# Patient Record
Sex: MALE | Race: WHITE | Employment: FULL TIME | URBAN - METROPOLITAN AREA
[De-identification: names, ages, dates, MRNs, and addresses within clinical notes are randomized per-mention and may not be internally consistent; named-entity substitution may affect disease eponyms.]

---

## 2019-04-08 RX ORDER — SODIUM CHLORIDE 0.9 % (FLUSH) 0.9 %
5-40 SYRINGE (ML) INJECTION AS NEEDED
Status: CANCELLED | OUTPATIENT
Start: 2019-04-08

## 2019-04-08 RX ORDER — SODIUM CHLORIDE 0.9 % (FLUSH) 0.9 %
5-40 SYRINGE (ML) INJECTION EVERY 8 HOURS
Status: CANCELLED | OUTPATIENT
Start: 2019-04-08

## 2019-04-08 NOTE — H&P
HPI:   10/1/2018- Right Great toe pain . Alix Delgadillo no treatment    Location of pain - great toe   Type/severity pain -  aching burning    Aggravating factors -  shoes; standing/walking  Alleviating factors -  rest     PMSFH:  Included on the patient history form ; reviewed; updated    MEDS:   None  ALLERGIES:  NKDA  PMH:  none  SOCIAL: , single, nonsmoker    ROS:  Per POA form: positive and negative system reviews were discussed. I tried to relate any positive system review to the musculoskeletal complaint. For all others, recommend the PCP or any specialists    PE:  He is alert and oriented. Nutrition, appearance and mood all okay. RESPIRATIONS:  Unlabored with no obvious shortness of breath. CV:  Appears to have pedal pulses, color, capillary refill, subungual color. NEURO:  No abnormal reflexes or clonus. Sensation appears mostly intact to light touch and sharp/dull discrimination. SLR negative. No popliteal tenderness     SKIN:  Age nails; no swelling; no keloids; MTP thick      MS:  Standing = Right great toe swelling. Gait = no pain   Right great toe, ME, dorsal hallucal nerve pain; limited motion; 5/5 strength; no instability or crepitance     XR: Right side - Standing AP, lateral, oblique of the foot taken 10- w/ mild hallux rigidus   XR Impression:    As noted above    DIAGNOSIS:   Right hallux rigidus    Right great toe pain swelling ?? gout       The patient and I discussed the above diagnoses and explored surgical and conservative options. Discussed the Right foot MRI scan, ordering labs, Ibuprofen 800 mg BID      Discussed  custom insoles, accommodative shoes;  Rx carbon fiber   Injection done 10- the 1st MTP joint was injected with 1 cc. of Xylocaine and 1 cc. of steroid   Surgical options outlined      Surgery - Right    big toe cheilectomy, possible implant      big toe phalangeal osteotomy     op - anesthesia choice - 1 hour      sag. saw, drill twists, possible Cartiva implant           The patient understands the diagnosis of w/ conservative and surgical treatment. Surgery, the risks and complications, and the expected postoperative course are all outlined. Understands the fact that this is arthritis and, that through the non-fusion surgical procedure,  I simply will try to decrease the prominence, decreases the impingement and pain and improve toe motion. The patient also understands that I cannot alter the long-term outcome of arthritis and that arthritis is something that will progress in the future which may necessitate a fusion (outlined and offered). The patient is aware and accepts all of it as a condition of treatment. Understands WB in postop shoe and the fact that it may take a year for all the swelling to resolve.   The patient accepts and understands future fusion or implant

## 2019-04-09 ENCOUNTER — ANESTHESIA EVENT (OUTPATIENT)
Dept: SURGERY | Age: 24
End: 2019-04-09
Payer: COMMERCIAL

## 2019-04-10 ENCOUNTER — HOSPITAL ENCOUNTER (OUTPATIENT)
Age: 24
Setting detail: OUTPATIENT SURGERY
Discharge: HOME | End: 2019-04-10
Attending: ORTHOPAEDIC SURGERY | Admitting: ORTHOPAEDIC SURGERY
Payer: COMMERCIAL

## 2019-04-10 ENCOUNTER — ANESTHESIA (OUTPATIENT)
Dept: SURGERY | Age: 24
End: 2019-04-10
Payer: COMMERCIAL

## 2019-04-10 VITALS
OXYGEN SATURATION: 97 % | HEART RATE: 86 BPM | BODY MASS INDEX: 41.27 KG/M2 | DIASTOLIC BLOOD PRESSURE: 82 MMHG | SYSTOLIC BLOOD PRESSURE: 126 MMHG | RESPIRATION RATE: 14 BRPM | WEIGHT: 315 LBS | TEMPERATURE: 98.6 F

## 2019-04-10 PROCEDURE — 77030002916 HC SUT ETHLN J&J -A: Performed by: ORTHOPAEDIC SURGERY

## 2019-04-10 PROCEDURE — 77030000032 HC CUF TRNQT ZIMM -B: Performed by: ORTHOPAEDIC SURGERY

## 2019-04-10 PROCEDURE — 76210000063 HC OR PH I REC FIRST 0.5 HR: Performed by: ORTHOPAEDIC SURGERY

## 2019-04-10 PROCEDURE — 77030031139 HC SUT VCRL2 J&J -A: Performed by: ORTHOPAEDIC SURGERY

## 2019-04-10 PROCEDURE — 74011250636 HC RX REV CODE- 250/636: Performed by: ORTHOPAEDIC SURGERY

## 2019-04-10 PROCEDURE — 74011250636 HC RX REV CODE- 250/636: Performed by: ANESTHESIOLOGY

## 2019-04-10 PROCEDURE — 77030019940 HC BLNKT HYPOTHRM STRY -B: Performed by: NURSE ANESTHETIST, CERTIFIED REGISTERED

## 2019-04-10 PROCEDURE — 76010010054 HC POST OP PAIN BLOCK: Performed by: ORTHOPAEDIC SURGERY

## 2019-04-10 PROCEDURE — 77030006788 HC BLD SAW OSC STRY -B: Performed by: ORTHOPAEDIC SURGERY

## 2019-04-10 PROCEDURE — 74011250636 HC RX REV CODE- 250/636

## 2019-04-10 PROCEDURE — 76942 ECHO GUIDE FOR BIOPSY: CPT | Performed by: ORTHOPAEDIC SURGERY

## 2019-04-10 PROCEDURE — 77030010509 HC AIRWY LMA MSK TELE -A: Performed by: NURSE ANESTHETIST, CERTIFIED REGISTERED

## 2019-04-10 PROCEDURE — 77030003902 HC BIT DRL TWST ZIMM -B: Performed by: ORTHOPAEDIC SURGERY

## 2019-04-10 PROCEDURE — 77030003602 HC NDL NRV BLK BBMI -B: Performed by: NURSE ANESTHETIST, CERTIFIED REGISTERED

## 2019-04-10 PROCEDURE — 76210000020 HC REC RM PH II FIRST 0.5 HR: Performed by: ORTHOPAEDIC SURGERY

## 2019-04-10 PROCEDURE — 77030018836 HC SOL IRR NACL ICUM -A: Performed by: ORTHOPAEDIC SURGERY

## 2019-04-10 PROCEDURE — 76060000033 HC ANESTHESIA 1 TO 1.5 HR: Performed by: ORTHOPAEDIC SURGERY

## 2019-04-10 PROCEDURE — 76010000138 HC OR TIME 0.5 TO 1 HR: Performed by: ORTHOPAEDIC SURGERY

## 2019-04-10 RX ORDER — LIDOCAINE HYDROCHLORIDE 20 MG/ML
INJECTION, SOLUTION EPIDURAL; INFILTRATION; INTRACAUDAL; PERINEURAL AS NEEDED
Status: DISCONTINUED | OUTPATIENT
Start: 2019-04-10 | End: 2019-04-10 | Stop reason: HOSPADM

## 2019-04-10 RX ORDER — LIDOCAINE HYDROCHLORIDE 10 MG/ML
0.1 INJECTION INFILTRATION; PERINEURAL AS NEEDED
Status: DISCONTINUED | OUTPATIENT
Start: 2019-04-10 | End: 2019-04-10 | Stop reason: HOSPADM

## 2019-04-10 RX ORDER — MIDAZOLAM HYDROCHLORIDE 1 MG/ML
2 INJECTION, SOLUTION INTRAMUSCULAR; INTRAVENOUS ONCE
Status: COMPLETED | OUTPATIENT
Start: 2019-04-10 | End: 2019-04-10

## 2019-04-10 RX ORDER — DEXAMETHASONE SODIUM PHOSPHATE 4 MG/ML
INJECTION, SOLUTION INTRA-ARTICULAR; INTRALESIONAL; INTRAMUSCULAR; INTRAVENOUS; SOFT TISSUE AS NEEDED
Status: DISCONTINUED | OUTPATIENT
Start: 2019-04-10 | End: 2019-04-10 | Stop reason: HOSPADM

## 2019-04-10 RX ORDER — SODIUM CHLORIDE, SODIUM LACTATE, POTASSIUM CHLORIDE, CALCIUM CHLORIDE 600; 310; 30; 20 MG/100ML; MG/100ML; MG/100ML; MG/100ML
100 INJECTION, SOLUTION INTRAVENOUS CONTINUOUS
Status: DISCONTINUED | OUTPATIENT
Start: 2019-04-10 | End: 2019-04-10 | Stop reason: HOSPADM

## 2019-04-10 RX ORDER — NALOXONE HYDROCHLORIDE 0.4 MG/ML
0.04 INJECTION, SOLUTION INTRAMUSCULAR; INTRAVENOUS; SUBCUTANEOUS
Status: DISCONTINUED | OUTPATIENT
Start: 2019-04-10 | End: 2019-04-10 | Stop reason: HOSPADM

## 2019-04-10 RX ORDER — ONDANSETRON 2 MG/ML
INJECTION INTRAMUSCULAR; INTRAVENOUS AS NEEDED
Status: DISCONTINUED | OUTPATIENT
Start: 2019-04-10 | End: 2019-04-10 | Stop reason: HOSPADM

## 2019-04-10 RX ORDER — SODIUM CHLORIDE 0.9 % (FLUSH) 0.9 %
5-40 SYRINGE (ML) INJECTION EVERY 8 HOURS
Status: DISCONTINUED | OUTPATIENT
Start: 2019-04-10 | End: 2019-04-10 | Stop reason: HOSPADM

## 2019-04-10 RX ORDER — SODIUM CHLORIDE 0.9 % (FLUSH) 0.9 %
5-40 SYRINGE (ML) INJECTION AS NEEDED
Status: DISCONTINUED | OUTPATIENT
Start: 2019-04-10 | End: 2019-04-10 | Stop reason: HOSPADM

## 2019-04-10 RX ORDER — FENTANYL CITRATE 50 UG/ML
100 INJECTION, SOLUTION INTRAMUSCULAR; INTRAVENOUS ONCE
Status: COMPLETED | OUTPATIENT
Start: 2019-04-10 | End: 2019-04-10

## 2019-04-10 RX ORDER — MIDAZOLAM HYDROCHLORIDE 1 MG/ML
2 INJECTION, SOLUTION INTRAMUSCULAR; INTRAVENOUS
Status: DISCONTINUED | OUTPATIENT
Start: 2019-04-10 | End: 2019-04-10 | Stop reason: HOSPADM

## 2019-04-10 RX ORDER — PROPOFOL 10 MG/ML
INJECTION, EMULSION INTRAVENOUS AS NEEDED
Status: DISCONTINUED | OUTPATIENT
Start: 2019-04-10 | End: 2019-04-10 | Stop reason: HOSPADM

## 2019-04-10 RX ORDER — OXYCODONE HYDROCHLORIDE 5 MG/1
5 TABLET ORAL
Status: DISCONTINUED | OUTPATIENT
Start: 2019-04-10 | End: 2019-04-10 | Stop reason: HOSPADM

## 2019-04-10 RX ORDER — HYDROMORPHONE HYDROCHLORIDE 2 MG/ML
0.5 INJECTION, SOLUTION INTRAMUSCULAR; INTRAVENOUS; SUBCUTANEOUS
Status: DISCONTINUED | OUTPATIENT
Start: 2019-04-10 | End: 2019-04-10 | Stop reason: HOSPADM

## 2019-04-10 RX ADMIN — FENTANYL CITRATE 100 MCG: 50 INJECTION INTRAMUSCULAR; INTRAVENOUS at 07:56

## 2019-04-10 RX ADMIN — PROPOFOL 200 MG: 10 INJECTION, EMULSION INTRAVENOUS at 09:24

## 2019-04-10 RX ADMIN — PROPOFOL 100 MG: 10 INJECTION, EMULSION INTRAVENOUS at 09:25

## 2019-04-10 RX ADMIN — DEXAMETHASONE SODIUM PHOSPHATE 4 MG: 4 INJECTION, SOLUTION INTRA-ARTICULAR; INTRALESIONAL; INTRAMUSCULAR; INTRAVENOUS; SOFT TISSUE at 09:29

## 2019-04-10 RX ADMIN — Medication 3 G: at 09:31

## 2019-04-10 RX ADMIN — LIDOCAINE HYDROCHLORIDE 60 MG: 20 INJECTION, SOLUTION EPIDURAL; INFILTRATION; INTRACAUDAL; PERINEURAL at 09:24

## 2019-04-10 RX ADMIN — SODIUM CHLORIDE, SODIUM LACTATE, POTASSIUM CHLORIDE, AND CALCIUM CHLORIDE 100 ML/HR: 600; 310; 30; 20 INJECTION, SOLUTION INTRAVENOUS at 07:54

## 2019-04-10 RX ADMIN — ONDANSETRON 4 MG: 2 INJECTION INTRAMUSCULAR; INTRAVENOUS at 09:29

## 2019-04-10 RX ADMIN — MIDAZOLAM HYDROCHLORIDE 2 MG: 2 INJECTION, SOLUTION INTRAMUSCULAR; INTRAVENOUS at 07:56

## 2019-04-10 NOTE — ANESTHESIA PROCEDURE NOTES
Peripheral Block    Start time: 4/10/2019 8:02 AM  End time: 4/10/2019 8:03 AM  Performed by: Christy Garay MD  Authorized by: Christy Garay MD       Pre-procedure: Indications: at surgeon's request and post-op pain management    Preanesthetic Checklist: patient identified, risks and benefits discussed, site marked, timeout performed, anesthesia consent given and patient being monitored    Timeout Time: 08:02          Block Type:   Block Type:  Saphenous  Laterality:  Right  Monitoring:  Standard ASA monitoring, responsive to questions, continuous pulse ox, oxygen, heart rate and frequent vital sign checks  Injection Technique:  Single shot  Patient Position: supine  Prep: chlorhexidine    Location:  Medial anterior leg above ankle  Catheter size: 25 ga 1.5 inch needle.   Needle Localization:  Anatomical landmarks    Assessment:  Number of attempts:  1  Injection Assessment:  Incremental injection every 5 mL, negative aspiration for blood, no intravascular symptoms and no paresthesia  Patient tolerance:  Patient tolerated the procedure well with no immediate complications

## 2019-04-10 NOTE — ANESTHESIA PROCEDURE NOTES
Peripheral Block    Start time: 4/10/2019 7:56 AM  End time: 4/10/2019 8:02 AM  Performed by: Sherlene Boas, MD  Authorized by: Sherlene Boas, MD       Pre-procedure: Indications: at surgeon's request, post-op pain management and procedure for pain    Preanesthetic Checklist: patient identified, risks and benefits discussed, site marked, timeout performed, anesthesia consent given and patient being monitored    Timeout Time: 07:56          Block Type:   Block Type:  Popliteal  Laterality:  Right  Monitoring:  Standard ASA monitoring, continuous pulse ox, frequent vital sign checks, heart rate, oxygen and responsive to questions  Injection Technique:  Single shot  Procedures: ultrasound guided and nerve stimulator    Prep: chlorhexidine    Needle Type:  Stimuplex (4 Inch)  Needle Gauge:  20 G  Needle Localization:  Ultrasound guidance and nerve stimulator  Motor Response: minimal motor response >0.4 mA      Assessment:  Number of attempts:  1  Injection Assessment:  Incremental injection every 5 mL, local visualized surrounding nerve on ultrasound, negative aspiration for blood, no paresthesia, no intravascular symptoms and ultrasound image on chart  Patient tolerance:  Patient tolerated the procedure well with no immediate complications  3 cc 1% lidocaine injected at site of needle insertion. Needle inserted and placed in close proximity to the nerve under real time ultrasound guidance. Ultrasound was used to visualize the spread of local anesthetic in close proximity to the nerve being blocked. The nerve appeared anatomically normal and there were no abnormal findings.

## 2019-04-10 NOTE — ANESTHESIA POSTPROCEDURE EVALUATION
Procedure(s): RIGHT BIG TOE CHEILECTOMy 
RIGHT BIG TOE OSTEOTOMY/. 
 
general 
 
Anesthesia Post Evaluation Patient location during evaluation: PACU Patient participation: complete - patient participated Level of consciousness: awake Pain management: satisfactory to patient Airway patency: patent Anesthetic complications: no 
Cardiovascular status: hemodynamically stable Respiratory status: spontaneous ventilation Hydration status: euvolemic Post anesthesia nausea and vomiting:  none Vitals Value Taken Time /87 4/10/2019 10:41 AM  
Temp 37 °C (98.6 °F) 4/10/2019 10:22 AM  
Pulse 85 4/10/2019 10:42 AM  
Resp 16 4/10/2019 10:27 AM  
SpO2 95 % 4/10/2019 10:42 AM  
Vitals shown include unvalidated device data.

## 2019-04-10 NOTE — ANESTHESIA PREPROCEDURE EVALUATION
Relevant Problems No relevant active problems Anesthetic History No history of anesthetic complications Review of Systems / Medical History Pertinent labs reviewed Pulmonary Within defined limits Neuro/Psych Within defined limits Cardiovascular Within defined limits Exercise tolerance: >4 METS 
  
GI/Hepatic/Renal 
Within defined limits Endo/Other Morbid obesity Other Findings Physical Exam 
 
Airway Mallampati: II 
TM Distance: 4 - 6 cm Neck ROM: normal range of motion Mouth opening: Normal 
 
 Cardiovascular Regular rate and rhythm,  S1 and S2 normal,  no murmur, click, rub, or gallop Dental 
No notable dental hx Pulmonary Breath sounds clear to auscultation Abdominal 
GI exam deferred Other Findings Anesthetic Plan ASA: 2 Anesthesia type: general 
 
 
Post-op pain plan if not by surgeon: peripheral nerve block single Induction: Intravenous Anesthetic plan and risks discussed with: Patient, Mother and Father Pt requests GA, plan LMA.

## 2019-04-10 NOTE — H&P
Outpatient Surgery History and Physical: 
[facility] CHIEF COMPLAINT:   LE 
 
PE: There were no vitals taken for this visit. Heart:  No noted problems Lungs:  No noted problems Past Medical History: There are no active problems to display for this patient. Surgical History:  
Past Surgical History:  
Procedure Laterality Date  HX ADENOIDECTOMY    
 wisom teeth removed Social History: Patient  reports that he has never smoked. He has never used smokeless tobacco. He reports that he drinks alcohol. He reports that he has current or past drug history. Family History:  
Family History Problem Relation Age of Onset  No Known Problems Mother  No Known Problems Father Allergies: Reviewed per EMR No Known Allergies Medications: No current facility-administered medications on file prior to encounter. No current outpatient medications on file prior to encounter. The surgery is planned for the Right big toe History and physical have been reviewed. There have been no significant  changes since the completion of the updated history and physical. 
 
Necessity for the procedure/care is still present and the updated history and physical office notes outline the full long term history of the problem. Please see the updated office notes for the full musculoskeletal examination and surgical plan.  
 
Signed By: Ben Jacobs MD   
 April 10, 2019 7:21 AM

## 2019-04-10 NOTE — DISCHARGE INSTRUCTIONS
INSTRUCTIONS FOLLOWING FOOT SURGERY    ACTIVITY  Elevate foot. No Ice  Protected partial weight bearing on the heel only as tolerated in post op shoe after full sensation returns. Let the office know if dressing gets saturated with water . DIET  Day of Surgery: Clear liquids until no nausea or vomiting; then light, bland diet (Baked chicken, plain rice, grits, scrambled egg, toast). Nothing Greasy, fried or spicy today  Advance to regular diet on second day, unless your doctor orders otherwise. PAIN  Take pain medications as directed by your doctor. Call your doctor if pain is NOT relieved by medication. PAIN MED SIDE EFFECTS  Constipation: Lots of fluids, try prune juice, then OTC stool softeners if necessary  Nausea: Take medication with food. DRESSING CARE Keep dry and in place until follow up appointment    CALL YOUR DOCTOR IF YOU HAVE  Excessive bleeding that does not stop after holding mild pressure over the area. Temperature of 101 degrees or above. Redness, excessive swelling or bruising, and/or green or yellow, smelly discharge from incision. Loss of sensation - cold, white or blue toes. AFTER ANESTHESIA  For the first 24 hours and while taking narcotics for pain: DO NOT Drive, Drink Alcoholic beverages, or make important Decisions. Be aware of dizziness following anesthesia and while taking pain medication. Preventing Infection at Home  We care about preventing infection and avoiding the spread of germs - not only when you are in the hospital but also when you return home. When you return home from the hospital, its important to take the following steps to help prevent infection and avoid spreading germs that could infect you and others. Ask everyone in your home to follow these guidelines, too. Clean Your Hands  · Clean your hands whenever your hands are visibly dirty, before you eat, before or after touching your mouth, nose or eyes, and before preparing food.  Clean them after contact with body fluids, using the restroom, touching animals or changing diapers. · When washing hands, wet them with warm water and work up a lather. Rub hands for at least 15 seconds, then rinse them and pat them dry with a clean towel or paper towel. · When using hand sanitizers, it should take about 15 seconds to rub your hands dry. If not, you probably didnt apply enough . Cover Your Sneeze or Cough  Germs are released into the air whenever you sneeze or cough. To prevent the spread of infection:  · Turn away from other people before coughing or sneezing. · Cover your mouth or nose with a tissue when you cough or sneeze. Put the tissue in the trash. · If you dont have a tissue, cough or sneeze into your upper sleeve, not your hands. · Always clean your hands after coughing or sneezing. Care for Wounds  Your skin is your bodys first line of defense against germs, but an open wound leaves an easy way for germs to enter your body. To prevent infection:  · Clean your hands before and after changing wound dressings, and wear gloves to change dressings if recommended by your doctor. · Take special care with IV lines or other devices inserted into the body. If you must touch them, clean your hands first.  · Follow any specific instructions from your doctor to care for your wounds. Contact your doctor if you experience any signs of infection, such as fever or increased redness at the surgical or wound site. Keep a Clean Home  · Clean or wipe commonly touched hard surfaces like door handles, sinks, tabletops, phones and TV remotes. · Use products labeled disinfectant to kill harmful bacteria and viruses. · Use a clean cloth or paper towel to clean and dry surfaces. Wiping surfaces with a dirty dishcloth, sponge or towel will only spread germs. · Never share toothbrushes, hutchins, drinking glasses, utensils, razor blades, face cloths or bath towels to avoid spreading germs.   · Be sure that the linens that you sleep on are clean. · Keep pets away from wounds and wash your hands after touching pets, their toys or bedding. We care about you and your health. Remember, preventing infections is a team effort between you, your family, friends and health care providers. DISCHARGE SUMMARY from Nurse    PATIENT INSTRUCTIONS:    After general anesthesia or intravenous sedation, for 24 hours or while taking prescription Narcotics:  · Limit your activities  · Do not drive and operate hazardous machinery  · Do not make important personal or business decisions  · Do  not drink alcoholic beverages  · If you have not urinated within 8 hours after discharge, please contact your surgeon on call. *  Please give a list of your current medications to your Primary Care Provider. *  Please update this list whenever your medications are discontinued, doses are      changed, or new medications (including over-the-counter products) are added. *  Please carry medication information at all times in case of emergency situations. These are general instructions for a healthy lifestyle:    No smoking/ No tobacco products/ Avoid exposure to second hand smoke    Surgeon General's Warning:  Quitting smoking now greatly reduces serious risk to your health. Obesity, smoking, and sedentary lifestyle greatly increases your risk for illness    A healthy diet, regular physical exercise & weight monitoring are important for maintaining a healthy lifestyle    You may be retaining fluid if you have a history of heart failure or if you experience any of the following symptoms:  Weight gain of 3 pounds or more overnight or 5 pounds in a week, increased swelling in our hands or feet or shortness of breath while lying flat in bed. Please call your doctor as soon as you notice any of these symptoms; do not wait until your next office visit.     Recognize signs and symptoms of STROKE:    F-face looks uneven    A-arms unable to move or move unevenly    S-speech slurred or non-existent    T-time-call 911 as soon as signs and symptoms begin-DO NOT go       Back to bed or wait to see if you get better-TIME IS BRAIN.

## 2019-04-11 NOTE — OP NOTES
[facility]  OPERATIVE REPORT    Name:  Beverley Lynn  MR#:  [id number]  :  1995  ACCOUNT #:  [de-identified]  DATE OF SERVICE:  04/10/2019    PREOPERATIVE DIAGNOSES:  1. Right hallux rigidus. 2.  Right hallux valgus interphalangeus. POSTOPERATIVE DIAGNOSES:  1. Right hallux rigidus. 2.  Right hallux valgus interphalangeus. PROCEDURE PERFORMED:  1. Right great toe cheilectomy. 2.  Right big toe phalangeal closing wedge osteotomy. SURGEON:  Ja Reyes MD    ANESTHESIA:  Regional.    COMPLICATIONS:  None. SPECIMENS REMOVED:  None. ESTIMATED BLOOD LOSS:  Minimal.    FLUIDS:  Crystalloid. DRAINS:  None. TOURNIQUET TIME:  None. FINDINGS:  Intraoperatively, the patient had enough arthritis to warrant a dorsomedial and lateral cheilectomy but not enough to warrant a Cartiva implant. He seemed to have preserved the central and plantar portions of the joint. PROCEDURE IN DETAIL:  After successful induction of regional anesthesia, right leg was scrubbed, prepped and draped in the usual sterile fashion. Antibiotic issued. Time-out procedure and identification of surgical markings were done by me. Right leg was addressed with the dorsomedial approach. Findings of above were seen. I was able to do a cheilectomy with removal of all impinging bone. After removal of all the bone, the toe had unabated range of motion. No impingement was noted and no problems were noted in the plantar aspect of the joint. Wound thoroughly cleaned and then closed with Vicryl. Toe was again taken through a range of motion and found to have unabated full motion. Proximal phalangeal closing wedge osteotomy was then performed. The phalangeal osteotomy was then closed with 0 Vicryl through drill holes and the wound was then thoroughly cleaned and closed with Ethilon. The patient was placed in sterile dressings and seemed to tolerate the procedure well.         STEVIE FORTUNE Shanique Garsia MD      [id number]  D:  04/10/2019 10:10  T:  04/10/2019 14:18  JOB #:  [id number]

## 2020-07-01 ENCOUNTER — HOSPITAL ENCOUNTER (OUTPATIENT)
Dept: PHYSICAL THERAPY | Age: 25
Discharge: HOME | End: 2020-07-01
Payer: COMMERCIAL

## 2020-07-01 PROCEDURE — 97161 PT EVAL LOW COMPLEX 20 MIN: CPT

## 2020-07-01 PROCEDURE — 97110 THERAPEUTIC EXERCISES: CPT

## 2020-07-01 NOTE — THERAPY EVALUATION
Wallace Lugo  : 1995  Primary: [place]  Secondary:  [address] @ [place] [address].  [phone]   [phone]       OUTPATIENT PHYSICAL THERAPY:Initial Assessment 2020   ICD-10: Treatment Diagnosis: Pain in right knee (M25.561) and Stiffness of right knee, not elsewhere classified (M25.661)  Precautions/Allergies:   Patient has no known allergies. Ambulatory/Rehab Services H2 Model Falls Risk Assessment    Risk Factors:       (1)  Gender [Male] Ability to Rise from Chair:       (0)  Ability to rise in a single movement    Falls Prevention Plan:       No modifications necessary   Total: (5 or greater = High Risk): 1     [facility] [employer]. All Rights Reserved. [facility] Patent #[id number]. Federal Law prohibits the replication, distribution or use without written permission from [employer]      MEDICAL/REFERRING DIAGNOSIS:  Pain in right knee [M25.561]   DATE OF ONSET: 20  REFERRING PHYSICIAN: Rivera Castle MD MD Orders: evaluate and treat: partial thickness PCL tear  Return MD Appointment: 6 weeks    INITIAL ASSESSMENT:  Mr. Verlena Barthel presents with impaired ROM and pain of right knee . This limits sitting, standing, lifting, bending and ambulatory tolerance and restricts ability to participate in ADLs, functional mobility and recreational activities. . Patient would benefit from skilled physical therapy to address above impairments. PROBLEM LIST (Impacting functional limitations):  1. Decreased Strength  2. Decreased ADL/Functional Activities  3. Decreased Transfer Abilities  4. Decreased Ambulation Ability/Technique  5. Increased Pain  6. Decreased Activity Tolerance  7. Decreased Flexibility/Joint Mobility INTERVENTIONS PLANNED: (Treatment may consist of any combination of the following)  1. Cryotherapy  2. Electrical Stimulation  3. Gait Training  4. Heat  5.  Home Exercise Program (HEP)  6. Manual Therapy  7. Neuromuscular Re-education/Strengthening  8. Therapeutic Activites  9. Therapeutic Exercise/Strengthening   TREATMENT PLAN:  Effective Dates: 7/1/2020 TO 7/31/2020 (30 days). Frequency/Duration: 2 times a week for 30 Day(s)  GOALS: (Goals have been discussed and agreed upon with patient.)  Short-Term Functional Goals: Time Frame: 2 weeks   # Goal Status   1 Pt will confirm compliance with home program to facilitate improvement in function. NEW     Discharge goals: Time frame: 4 weeks   # Goal Status   1 Pt will be able to tolerate sitting without limitation with no significant increase in symptoms to participate in ADLs such as desk work. NEW   2 Pt will increase walking tolerance to no limitation to be more mobile in community. NEW   3 Pt will have full knee extension without symptoms to be able to ambulate and perform functional mobility. NEW         Rehabilitation Potential For Stated Goals: Good  Regarding [facility] therapy, I certify that the treatment plan above will be carried out by a therapist or under their direction. Thank you for this referral,  Kelly Ayala, PT, DPT     Referring Physician Signature: Rivera Castle MD _______________________________ Date _____________     HISTORY:   History of Injury/Illness (Reason for Referral):  Pt reports he started having pain on 6/23/20 but does not know of any MARKY. He ignored it initially but it continued to get worse so he went to MD. He had x-ray and MRI which revealed partially torn PCL. He reports it feels like a deep ache in center of posterior knee with no numbness/tingling/burning. He reports he has pain if he walks or stands too long as well as with prolonged immobility such as sitting or lying down. He reports his pain has improved since MD told him to rest his knee and he decreased his activity. He would like to get back to PLOF including landscaping job and riding dirt bike.   Past Medical History/Comorbidities:   Mr. Trey Huerta  has no past medical history on file. Mr. Trey Huerta  has a past surgical history that includes hx adenoidectomy. Social History/Living Environment:     Pt lives with family in two-story home with stairs in home and tub/shower combo. Prior Level of Function/Work/Activity:  Pt works full-time as a landscaping worker. Pt had unrestricted prior level of function. Current Medications:     No current outpatient medications on file. Date Last Reviewed:  7/1/2020    Number of Personal Factors/Comorbidities that affect the Plan of Care: 0: LOW COMPLEXITY   EXAMINATION:   Observation: Unremarkable  Edema: Knee edema measurements   Left circumference (cm) Right circumference (cm)   Mid-patella 45.5 46     ROM:    Left (°) Right (°)   Flexion 140 140   Extension 3 Lacking 2      Lower body ANDT: Slump (+)  SLR (+) R, sensitized with spinal flexion and DF   Strength: WNL B with mild discomfort on R with resisted knee flexion and hip ER. Pt able to perform step up and down without compensation. Palpation: mild TTP with deep palpation of R posterior knee    Body Structures Involved:  1. Nerves  2. Joints  3. Muscles Body Functions Affected:  1. Sensory/Pain  2. Neuromusculoskeletal  3. Movement Related Activities and Participation Affected:  1. General Tasks and Demands  2. Mobility  3. Self Care  4. Community, Social and Fannin Monson   Number of elements (examined above) that affect the Plan of Care: 1-2: LOW COMPLEXITY   CLINICAL PRESENTATION:   Presentation: Stable and uncomplicated: LOW COMPLEXITY     CLINICAL DECISION MAKING:      OUTCOME MEASURE:   Initial outcome measure performed on 7/1/2020. Tool Used: Lower Extremity Functional Scale (LEFS)  Score:  Initial: 63/80 Most Recent: X/80 (Date: -- )   Interpretation of Score: 20 questions each scored on a 5 point scale with 0 representing \"extreme difficulty or unable to perform\" and 4 representing \"no difficulty\".   The lower the score, the greater the functional disability. 80/80 represents no disability. Minimal detectable change is 9 points. MEDICAL NECESSITY:   · Patient will benefit from skilled physical therapy to address their previously listed impairments in order to improve their independence with functional activities painfree. REASON FOR SERVICES/OTHER COMMENTS:  · Patient requires present interventions due to patient's inability to perform functional and recreational activities painfree.    Use of outcome tool(s) and clinical judgement create a POC that gives a: Clear prediction of patient's progress: LOW COMPLEXITY        Total Duration: 37 min  PT Patient Time In/Time Out  Time In: 1408  Time Out: 69 Latanya Mercer, PT, DPT

## 2020-07-01 NOTE — PROGRESS NOTES
Mohini Abel  : 1995  Primary: Jaiden Beckman  Secondary:  [address] @ [address], [facility].  [phone]   [phone]      OUTPATIENT PHYSICAL THERAPY: Daily Treatment Note 2020  Visit Count:  1    ICD-10: Treatment Diagnosis: Pain in right knee (M25.561) and Stiffness of right knee, not elsewhere classified (M25.661)  TREATMENT PLAN:  Effective Dates: 2020 TO 2020 (30 days). Frequency/Duration: 2 times a week for 30 Day(s)  GOALS: (Goals have been discussed and agreed upon with patient.)  Short-Term Functional Goals: Time Frame: 2 weeks   # Goal Status   1 Pt will confirm compliance with home program to facilitate improvement in function. NEW     Discharge goals: Time frame: 4 weeks   # Goal Status   1 Pt will be able to tolerate sitting without limitation with no significant increase in symptoms to participate in ADLs such as desk work. NEW   2 Pt will increase walking tolerance to no limitation to be more mobile in community. NEW   3 Pt will have full knee extension without symptoms to be able to ambulate and perform functional mobility. NEW       Pre-treatment Symptoms/Complaints:  Pt reports no pain at rest currently because he has been moving around. Pain: Initial:   0/10 Post Session:  0/10   Medications Last Reviewed: 2020  Updated Objective Findings: Below measures from initial evaluation unless otherwise noted. 20  Observation: Unremarkable  Edema: Knee edema measurements   Left circumference (cm) Right circumference (cm)   Mid-patella 45.5 46     ROM:    Left (°) Right (°)   Flexion 140 140   Extension 3 Lacking 2      Lower body ANDT: Slump (+)  SLR (+) R, sensitized with spinal flexion and DF   Strength: WNL B with mild discomfort on R with resisted knee flexion and hip ER. Pt able to perform step up and down without compensation.   Palpation: mild TTP with deep palpation of R posterior knee  LEFS: 63/80    TREATMENT:   THERAPEUTIC ACTIVITY: (see chart for minutes): Therapeutic activities per grid below to improve mobility, strength, balance and coordination. Required minimal visual, verbal and tactile cues to improve independence with functional mobility and activities. THERAPEUTIC EXERCISE: (see chart for minutes):  Exercises per grid below to improve mobility, strength, balance and coordination. Required minimal visual, verbal and tactile cues to promote proper body alignment and promote proper body mechanics. Progressed resistance, range, repetitions and complexity of movement as indicated. NEUROMUSCULAR RE-EDUCATION: (see chart for minutes):  Exercise/activities per grid below to improve balance, coordination, kinesthetic sense, posture, pain, and proprioception. Required minimal visual, verbal and tactile cues to promote static and dynamic balance in standing. MANUAL THERAPY: (see chart for minutes): Joint mobilization, Soft tissue mobilization, skin mobilization, and muscle energy techniques were utilized and necessary because of the patient's restricted joint motion, restricted motion of soft tissue and pain . MODALITIES: (see chart for minutes):      *  Cold Pack Therapy in order to provide analgesia. Date: 7/1/20 (visit 1)       Modalities:                Therapeutic Exercise: 25 min        SLR: x10 B  Hip abduction: x10 B  Hip IR in S/L: x10 B   Calf raises: x5. SL calf raises: x3 knee straight and bent  Lunges with R knee forward: x2   Step-ups and overs: x3 B   Sciatic nerve mobilization: 2x10 R with cervical flexion + knee flexion/PF to cervical/trunk extension + knee extension/DF. Pt educated on home program implementation and given printout.        Neuromuscular re-education                Manual Therapy:                Therapeutic Activities:                  Home program: 7/1/20: sciatic nerve mobilization, SLR, hip abduction, bridge    MedBridge Portal  Treatment/Session Summary:    · Response to Treatment: Pt tolerated exercises well and reported that his knee felt better after doing sciatic nerve mobilization. · Communication/Consultation:  None today  · Equipment provided today:  None today  · Recommendations/Intent for next treatment session: Next visit will focus on improving pain-free knee AROM, normalizing neurodynamics of knee, and improving activity tolearnce.     Total Treatment Billable Duration:  37 minutes  PT Patient Time In/Time Out  Time In: 2960  Time Out: [address]  Gama Zepeda PT, DPT    Future Appointments   Date Time Provider Lizbeth Aldridge   7/2/2020 10:15 AM Nolia Emms [facility]   7/6/2020  3:30 PM Nolia Emms [facility]   7/8/2020  3:30 PM Nolia Emms [facility]   7/13/2020  3:30 PM Nolia Emms [facility]   7/15/2020  2:45 PM Nolia Emms [facility]   7/20/2020  3:30 PM Nolia Emms [facility]   7/22/2020  2:45 PM Nolia Emms [facility]   7/27/2020  3:30 PM Nolia Emms [facility]   7/29/2020  2:45 PM Nolia Emms [facility]

## 2020-07-02 ENCOUNTER — HOSPITAL ENCOUNTER (OUTPATIENT)
Dept: PHYSICAL THERAPY | Age: 25
Discharge: HOME | End: 2020-07-02
Payer: COMMERCIAL

## 2020-07-02 PROCEDURE — 97110 THERAPEUTIC EXERCISES: CPT

## 2020-07-02 NOTE — PROGRESS NOTES
Daryn Griffiths  : 1995  Primary: Jaron Beckman  Secondary:  [address] @ [place] [id number] [facility], [address] [place] [address]  [phone]   [phone]      OUTPATIENT PHYSICAL THERAPY: Daily Treatment Note 2020  Visit Count:  2    ICD-10: Treatment Diagnosis: Pain in right knee (M25.561) and Stiffness of right knee, not elsewhere classified (M25.661)  TREATMENT PLAN:  Effective Dates: 2020 TO 2020 (30 days). Frequency/Duration: 2 times a week for 30 Day(s)  GOALS: (Goals have been discussed and agreed upon with patient.)  Short-Term Functional Goals: Time Frame: 2 weeks   # Goal Status   1 Pt will confirm compliance with home program to facilitate improvement in function. NEW     Discharge goals: Time frame: 4 weeks   # Goal Status   1 Pt will be able to tolerate sitting without limitation with no significant increase in symptoms to participate in ADLs such as desk work. NEW   2 Pt will increase walking tolerance to no limitation to be more mobile in community. NEW   3 Pt will have full knee extension without symptoms to be able to ambulate and perform functional mobility. NEW       Pre-treatment Symptoms/Complaints:  Pt reports he feels his leg feels slightly better today. He reports no soreness from exercises yesterday. Pain: Initial:   0/10 Post Session:  0/10   Medications Last Reviewed: 2020  Updated Objective Findings: Below measures from initial evaluation unless otherwise noted. 20  Observation: Unremarkable  Edema: Knee edema measurements   Left circumference (cm) Right circumference (cm)   Mid-patella 45.5 46     ROM:    Left (°) Right (°)   Flexion 140 140   Extension 3 Lacking 2      Lower body ANDT: Slump (+)  SLR (+) R, sensitized with spinal flexion and DF   Strength: WNL B with mild discomfort on R with resisted knee flexion and hip ER. Pt able to perform step up and down without compensation.   Palpation: mild TTP with deep palpation of R posterior knee  LEFS: 63/80    TREATMENT:   THERAPEUTIC ACTIVITY: (see chart for minutes): Therapeutic activities per grid below to improve mobility, strength, balance and coordination. Required minimal visual, verbal and tactile cues to improve independence with functional mobility and activities. THERAPEUTIC EXERCISE: (see chart for minutes):  Exercises per grid below to improve mobility, strength, balance and coordination. Required minimal visual, verbal and tactile cues to promote proper body alignment and promote proper body mechanics. Progressed resistance, range, repetitions and complexity of movement as indicated. NEUROMUSCULAR RE-EDUCATION: (see chart for minutes):  Exercise/activities per grid below to improve balance, coordination, kinesthetic sense, posture, pain, and proprioception. Required minimal visual, verbal and tactile cues to promote static and dynamic balance in standing. MANUAL THERAPY: (see chart for minutes): Joint mobilization, Soft tissue mobilization, skin mobilization, and muscle energy techniques were utilized and necessary because of the patient's restricted joint motion, restricted motion of soft tissue and pain . MODALITIES: (see chart for minutes):      *  Cold Pack Therapy in order to provide analgesia. Date: 7/1/20 (visit 1) 7/2/20 (visit 2)       Modalities:                Therapeutic Exercise: 25 min 42 min       SLR: x10 B  Hip abduction: x10 B  Hip IR in S/L: x10 B   Calf raises: x5. SL calf raises: x3 knee straight and bent  Lunges with R knee forward: x2   Step-ups and overs: x3 B   Sciatic nerve mobilization: 2x10 R with cervical flexion + knee flexion/PF to cervical/trunk extension + knee extension/DF. Pt educated on home program implementation and given printout.  Bike: 5'   SLR: 2x10 B  Bridge: x10  SL bridge: x10 B  Hip abduction: 2x10 B  Hip IR in S/L: 2x10 B   Lateral band walk: x1 lap with 80# band, knees straight and bent each Sciatic nerve mobilization: x10 R with cervical flexion + PF to cervical/trunk extension + DF. x10 with knee flexion/extension. Neuromuscular re-education                Manual Therapy:                Therapeutic Activities:                  Home program: 7/1/20: sciatic nerve mobilization, SLR, hip abduction, bridge    MedBridge Portal  Treatment/Session Summary:    · Response to Treatment: Pt tolerated exercises well and reported no pain afterwards just muscle tiredness. · Communication/Consultation:  None today  · Equipment provided today:  None today  · Recommendations/Intent for next treatment session: Next visit will focus on improving pain-free knee AROM, normalizing neurodynamics of knee, and improving activity tolearnce.     Total Treatment Billable Duration:  42 minutes  PT Patient Time In/Time Out  Time In: 1018  Time Out: [address] Janis Tran PT, DPT    Future Appointments   Date Time Provider Lizbeth Aldridge   7/6/2020  3:30 PM [facility]   7/8/2020  3:30 PM Camila Ping [facility]   7/13/2020  3:30 PM Camila Ping [facility]   7/15/2020  2:45 PM Camila Ping [facility]   7/20/2020  3:30 PM Camila Ping [facility]   7/22/2020  2:45 PM Camila Ping [facility]   7/27/2020  3:30 PM Camila Ping [facility]   7/29/2020  2:45 PM Camila Ping [facility]

## 2020-07-06 ENCOUNTER — HOSPITAL ENCOUNTER (OUTPATIENT)
Dept: PHYSICAL THERAPY | Age: 25
Discharge: HOME | End: 2020-07-06
Payer: COMMERCIAL

## 2020-07-06 PROCEDURE — 97110 THERAPEUTIC EXERCISES: CPT

## 2020-07-06 NOTE — PROGRESS NOTES
Tod Herrera  : 1995  Primary: [facility] [employer]  Secondary:  [address] @ [address], [place].  [phone]   [phone]      OUTPATIENT PHYSICAL THERAPY: Daily Treatment Note 2020  Visit Count:  3    ICD-10: Treatment Diagnosis: Pain in right knee (M25.561) and Stiffness of right knee, not elsewhere classified (M25.661)  TREATMENT PLAN:  Effective Dates: 2020 TO 2020 (30 days). Frequency/Duration: 2 times a week for 30 Day(s)  GOALS: (Goals have been discussed and agreed upon with patient.)  Short-Term Functional Goals: Time Frame: 2 weeks   # Goal Status   1 Pt will confirm compliance with home program to facilitate improvement in function. NEW     Discharge goals: Time frame: 4 weeks   # Goal Status   1 Pt will be able to tolerate sitting without limitation with no significant increase in symptoms to participate in ADLs such as desk work. NEW   2 Pt will increase walking tolerance to no limitation to be more mobile in community. NEW   3 Pt will have full knee extension without symptoms to be able to ambulate and perform functional mobility. NEW       Pre-treatment Symptoms/Complaints:  Pt reports his leg has been feeling significantly better. He reports previously his leg would sometimes hurt so bad he couldn't sleep. Now he reports it hurts at night a bit, but much less and he can sleep. He also reports driving feels better. Pain: Initial:   0/10 Post Session:  0/10   Medications Last Reviewed: 2020  Updated Objective Findings: Below measures from initial evaluation unless otherwise noted.   20  Observation: Unremarkable  Edema: Knee edema measurements   Left circumference (cm) Right circumference (cm)   Mid-patella 45.5 46     ROM:    Left (°) Right (°)   Flexion 140 140   Extension 3 Lacking 2      Lower body ANDT: Slump (+)  SLR (+) R, sensitized with spinal flexion and DF   Strength: WNL B with mild discomfort on R with resisted knee flexion and hip ER. Pt able to perform step up and down without compensation. Palpation: mild TTP with deep palpation of R posterior knee  LEFS: 63/80    TREATMENT:   THERAPEUTIC ACTIVITY: (see chart for minutes): Therapeutic activities per grid below to improve mobility, strength, balance and coordination. Required minimal visual, verbal and tactile cues to improve independence with functional mobility and activities. THERAPEUTIC EXERCISE: (see chart for minutes):  Exercises per grid below to improve mobility, strength, balance and coordination. Required minimal visual, verbal and tactile cues to promote proper body alignment and promote proper body mechanics. Progressed resistance, range, repetitions and complexity of movement as indicated. NEUROMUSCULAR RE-EDUCATION: (see chart for minutes):  Exercise/activities per grid below to improve balance, coordination, kinesthetic sense, posture, pain, and proprioception. Required minimal visual, verbal and tactile cues to promote static and dynamic balance in standing. MANUAL THERAPY: (see chart for minutes): Joint mobilization, Soft tissue mobilization, skin mobilization, and muscle energy techniques were utilized and necessary because of the patient's restricted joint motion, restricted motion of soft tissue and pain . MODALITIES: (see chart for minutes):      *  Cold Pack Therapy in order to provide analgesia. Date: 7/1/20 (visit 1) 7/2/20 (visit 2)  7/6/20 (visit 3)      Modalities:                Therapeutic Exercise: 25 min 42 min 43 min      SLR: x10 B  Hip abduction: x10 B  Hip IR in S/L: x10 B   Calf raises: x5. SL calf raises: x3 knee straight and bent  Lunges with R knee forward: x2   Step-ups and overs: x3 B   Sciatic nerve mobilization: 2x10 R with cervical flexion + knee flexion/PF to cervical/trunk extension + knee extension/DF. Pt educated on home program implementation and given printout.  Bike: 5'   SLR: 2x10 B  Bridge: x10  SL bridge: x10 B  Hip abduction: 2x10 B  Hip IR in S/L: 2x10 B   Lateral band walk: x1 lap with 80# band, knees straight and bent each   Sciatic nerve mobilization: x10 R with cervical flexion + PF to cervical/trunk extension + DF. x10 with knee flexion/extension. Bike: 5'   SLR: 2x10 B  SL bridge: 2x10 B   Sciatic nerve mobilization with knee flexion/extension: 2x10 R. Pt able to get knee almost completely extended now. Lunges on sliders: 2x8 B   Leg press: 3x5 with progressively increasing weight to warm-up. 5x240. Neuromuscular re-education                Manual Therapy:                Therapeutic Activities:                  Home program: 7/1/20: sciatic nerve mobilization, SLR, hip abduction, bridge    MedBridge Portal  Treatment/Session Summary:    · Response to Treatment: Pt progressing well and reports significant improvement in symptoms. He demonstrates improving neurodynamics and activity tolerance. · Communication/Consultation:  None today  · Equipment provided today:  None today  · Recommendations/Intent for next treatment session: Next visit will focus on improving pain-free knee AROM, normalizing neurodynamics of knee, and improving activity tolearnce.     Total Treatment Billable Duration:  43 minutes  PT Patient Time In/Time Out  Time In: 9110  Time Out: [address] Oliver Hatch, PT, DPT    Future Appointments   Date Time Provider Lizbeth Aldridge   7/8/2020  3:30 PM Margrette Randal [facility]   7/13/2020  3:30 PM Margrette Randal [facility]   7/15/2020  2:45 PM Margrette Randal [facility]   7/20/2020  3:30 PM Margrette Randal [facility]   7/22/2020  2:45 PM Margrette Randal [facility]   7/27/2020  3:30 PM Margrette Randal [facility]   7/29/2020  2:45 PM Margrette Randal [facility]

## 2020-07-08 ENCOUNTER — HOSPITAL ENCOUNTER (OUTPATIENT)
Dept: PHYSICAL THERAPY | Age: 25
Discharge: HOME | End: 2020-07-08
Payer: COMMERCIAL

## 2020-07-08 PROCEDURE — 97110 THERAPEUTIC EXERCISES: CPT

## 2020-07-08 NOTE — PROGRESS NOTES
Lucas Fleischer  : 1995  Primary: Chante Beckman  Secondary:  Cyril Abbasi @ [address], [address] [place] [address]  [phone]   [phone]      OUTPATIENT PHYSICAL THERAPY: Daily Treatment Note 2020  Visit Count:  4    ICD-10: Treatment Diagnosis: Pain in right knee (M25.561) and Stiffness of right knee, not elsewhere classified (M25.661)  TREATMENT PLAN:  Effective Dates: 2020 TO 2020 (30 days). Frequency/Duration: 2 times a week for 30 Day(s)  GOALS: (Goals have been discussed and agreed upon with patient.)  Short-Term Functional Goals: Time Frame: 2 weeks   # Goal Status   1 Pt will confirm compliance with home program to facilitate improvement in function. NEW     Discharge goals: Time frame: 4 weeks   # Goal Status   1 Pt will be able to tolerate sitting without limitation with no significant increase in symptoms to participate in ADLs such as desk work. NEW   2 Pt will increase walking tolerance to no limitation to be more mobile in community. NEW   3 Pt will have full knee extension without symptoms to be able to ambulate and perform functional mobility. NEW       Pre-treatment Symptoms/Complaints:  Pt reports sitting for prolonged periods of time, driving, and sleeping have continued to improve. He reports he still has tightness in posterior R knee with certain activities. Pain: Initial:   0/10 Post Session:  0/10   Medications Last Reviewed: 2020  Updated Objective Findings: Below measures from initial evaluation unless otherwise noted. 20  Observation: Unremarkable  Edema: Knee edema measurements   Left circumference (cm) Right circumference (cm)   Mid-patella 45.5 46     ROM:    Left (°) Right (°)   Flexion 140 140   Extension 3 Lacking 2      Lower body ANDT: Slump (+)  SLR (+) R, sensitized with spinal flexion and DF   Strength: WNL B with mild discomfort on R with resisted knee flexion and hip ER.  Pt able to perform step up and down without compensation. Palpation: mild TTP with deep palpation of R posterior knee  LEFS: 63/80    TREATMENT:   THERAPEUTIC ACTIVITY: (see chart for minutes): Therapeutic activities per grid below to improve mobility, strength, balance and coordination. Required minimal visual, verbal and tactile cues to improve independence with functional mobility and activities. THERAPEUTIC EXERCISE: (see chart for minutes):  Exercises per grid below to improve mobility, strength, balance and coordination. Required minimal visual, verbal and tactile cues to promote proper body alignment and promote proper body mechanics. Progressed resistance, range, repetitions and complexity of movement as indicated. NEUROMUSCULAR RE-EDUCATION: (see chart for minutes):  Exercise/activities per grid below to improve balance, coordination, kinesthetic sense, posture, pain, and proprioception. Required minimal visual, verbal and tactile cues to promote static and dynamic balance in standing. MANUAL THERAPY: (see chart for minutes): Joint mobilization, Soft tissue mobilization, skin mobilization, and muscle energy techniques were utilized and necessary because of the patient's restricted joint motion, restricted motion of soft tissue and pain . MODALITIES: (see chart for minutes):      *  Cold Pack Therapy in order to provide analgesia. Date: 7/1/20 (visit 1) 7/2/20 (visit 2)  7/6/20 (visit 3)  7/8/20 (visit 4)     Modalities:                Therapeutic Exercise: 25 min 42 min 43 min 40 min     SLR: x10 B  Hip abduction: x10 B  Hip IR in S/L: x10 B   Calf raises: x5. SL calf raises: x3 knee straight and bent  Lunges with R knee forward: x2   Step-ups and overs: x3 B   Sciatic nerve mobilization: 2x10 R with cervical flexion + knee flexion/PF to cervical/trunk extension + knee extension/DF. Pt educated on home program implementation and given printout.  Bike: 5'   SLR: 2x10 B  Bridge: x10  SL bridge: x10 B  Hip abduction: 2x10 B  Hip IR in S/L: 2x10 B   Lateral band walk: x1 lap with 80# band, knees straight and bent each   Sciatic nerve mobilization: x10 R with cervical flexion + PF to cervical/trunk extension + DF. x10 with knee flexion/extension. Bike: 5'   SLR: 2x10 B  SL bridge: 2x10 B   Sciatic nerve mobilization with knee flexion/extension: 2x10 R. Pt able to get knee almost completely extended now. Lunges on sliders: 2x8 B   Leg press: 3x5 with progressively increasing weight to warm-up. 5x240. Bike: 5'   SLR: 2x10 B  SL bridge: 2x10 B   Sciatic nerve mobilization with knee flexion/extension: x10 R  Sciatic nerve mobilization in long sitting with PF/DF and cervical flexion/extension: x10. Pt educated to add to home program.   50# Sled push: 2 laps with arms bent, 2 laps arms straight  SL RDL: x5 B. Pt reported a little feeling of strain on R. Neuromuscular re-education                Manual Therapy:                Therapeutic Activities:                  Home program: 7/1/20: sciatic nerve mobilization, SLR, hip abduction, bridge    MedBridge Portal  Treatment/Session Summary:    · Response to Treatment: Pt continues to tolerated exercises well and demonstrate improving neurodynamics. · Communication/Consultation:  None today  · Equipment provided today:  None today  · Recommendations/Intent for next treatment session: Next visit will focus on improving pain-free knee AROM, normalizing neurodynamics of knee, and improving activity tolearnce.     Total Treatment Billable Duration:  40 minutes  PT Patient Time In/Time Out  Time In: 2183  Time Out: [address] Dori Briceño, PT, DPT    Future Appointments   Date Time Provider Lizbeth Aldridge   7/13/2020  3:30 PM Talala Furrow [facility]   7/15/2020  2:45 PM Talala Furrow [facility]   7/20/2020  3:30 PM Talala Furrow [facility]   7/22/2020  2:45 PM Talala Furrow [facility]   7/27/2020  3:30 PM Talala Furrow [facility]   7/29/2020  2:45 PM Cresenciano Alvaroitar N [facility]

## 2020-07-10 ENCOUNTER — APPOINTMENT (OUTPATIENT)
Dept: PHYSICAL THERAPY | Age: 25
End: 2020-07-10
Payer: COMMERCIAL

## 2020-07-13 ENCOUNTER — HOSPITAL ENCOUNTER (OUTPATIENT)
Dept: PHYSICAL THERAPY | Age: 25
Discharge: HOME | End: 2020-07-13
Payer: COMMERCIAL

## 2020-07-13 NOTE — PROGRESS NOTES
Patient cancelled today's appointment due to conflict.  He has rescheduled for Friday.   -Samara Mercado, PT, DPT

## 2020-07-17 ENCOUNTER — APPOINTMENT (OUTPATIENT)
Dept: PHYSICAL THERAPY | Age: 25
End: 2020-07-17
Payer: COMMERCIAL

## 2020-07-17 ENCOUNTER — HOSPITAL ENCOUNTER (OUTPATIENT)
Dept: PHYSICAL THERAPY | Age: 25
Discharge: HOME | End: 2020-07-17
Payer: COMMERCIAL

## 2020-07-17 PROCEDURE — 97110 THERAPEUTIC EXERCISES: CPT

## 2020-07-17 NOTE — PROGRESS NOTES
Wallace Lugo  : 1995  Primary: [employer]  Secondary:  [address] @ [address], [address] [place] [address]  [phone]   [phone]      OUTPATIENT PHYSICAL THERAPY: Daily Treatment Note 2020  Visit Count:  5    ICD-10: Treatment Diagnosis: Pain in right knee (M25.561) and Stiffness of right knee, not elsewhere classified (M25.661)  TREATMENT PLAN:  Effective Dates: 2020 TO 2020 (30 days). Frequency/Duration: 2 times a week for 30 Day(s)  GOALS: (Goals have been discussed and agreed upon with patient.)  Short-Term Functional Goals: Time Frame: 2 weeks   # Goal Status   1 Pt will confirm compliance with home program to facilitate improvement in function. NEW     Discharge goals: Time frame: 4 weeks   # Goal Status   1 Pt will be able to tolerate sitting without limitation with no significant increase in symptoms to participate in ADLs such as desk work. NEW   2 Pt will increase walking tolerance to no limitation to be more mobile in community. NEW   3 Pt will have full knee extension without symptoms to be able to ambulate and perform functional mobility. NEW       Pre-treatment Symptoms/Complaints:  Pt reports his leg has continued to feel better. He reports he notices minor pain sometimes when lying down for prolonged periods but he can sleep. Pain: Initial:   0/10 Post Session:  0/10   Medications Last Reviewed: 2020  Updated Objective Findings: Below measures from initial evaluation unless otherwise noted. 20  Observation: Unremarkable  Edema: Knee edema measurements   Left circumference (cm) Right circumference (cm)   Mid-patella 45.5 46     ROM:    Left (°) Right (°)   Flexion 140 140   Extension 3 Lacking 2      Lower body ANDT: Slump (+)  SLR (+) R, sensitized with spinal flexion and DF   Strength: WNL B with mild discomfort on R with resisted knee flexion and hip ER.  Pt able to perform step up and down without compensation. Palpation: mild TTP with deep palpation of R posterior knee  LEFS: 63/80    TREATMENT:   THERAPEUTIC ACTIVITY: (see chart for minutes): Therapeutic activities per grid below to improve mobility, strength, balance and coordination. Required minimal visual, verbal and tactile cues to improve independence with functional mobility and activities. THERAPEUTIC EXERCISE: (see chart for minutes):  Exercises per grid below to improve mobility, strength, balance and coordination. Required minimal visual, verbal and tactile cues to promote proper body alignment and promote proper body mechanics. Progressed resistance, range, repetitions and complexity of movement as indicated. NEUROMUSCULAR RE-EDUCATION: (see chart for minutes):  Exercise/activities per grid below to improve balance, coordination, kinesthetic sense, posture, pain, and proprioception. Required minimal visual, verbal and tactile cues to promote static and dynamic balance in standing. MANUAL THERAPY: (see chart for minutes): Joint mobilization, Soft tissue mobilization, skin mobilization, and muscle energy techniques were utilized and necessary because of the patient's restricted joint motion, restricted motion of soft tissue and pain . MODALITIES: (see chart for minutes):      *  Cold Pack Therapy in order to provide analgesia. Date: 7/1/20 (visit 1) 7/2/20 (visit 2)  7/6/20 (visit 3)  7/8/20 (visit 4)  7/17/20 (visit 5)   Modalities:                Therapeutic Exercise: 25 min 42 min 43 min 40 min 40 min    SLR: x10 B  Hip abduction: x10 B  Hip IR in S/L: x10 B   Calf raises: x5. SL calf raises: x3 knee straight and bent  Lunges with R knee forward: x2   Step-ups and overs: x3 B   Sciatic nerve mobilization: 2x10 R with cervical flexion + knee flexion/PF to cervical/trunk extension + knee extension/DF. Pt educated on home program implementation and given printout.  Bike: 5'   SLR: 2x10 B  Bridge: x10  SL bridge: x10 B  Hip abduction: 2x10 B  Hip IR in S/L: 2x10 B   Lateral band walk: x1 lap with 80# band, knees straight and bent each   Sciatic nerve mobilization: x10 R with cervical flexion + PF to cervical/trunk extension + DF. x10 with knee flexion/extension. Bike: 5'   SLR: 2x10 B  SL bridge: 2x10 B   Sciatic nerve mobilization with knee flexion/extension: 2x10 R. Pt able to get knee almost completely extended now. Lunges on sliders: 2x8 B   Leg press: 3x5 with progressively increasing weight to warm-up. 5x240. Bike: 5'   SLR: 2x10 B  SL bridge: 2x10 B   Sciatic nerve mobilization with knee flexion/extension: x10 R  Sciatic nerve mobilization in long sitting with PF/DF and cervical flexion/extension: x10. Pt educated to add to home program.   50# Sled push: 2 laps with arms bent, 2 laps arms straight  SL RDL: x5 B. Pt reported a little feeling of strain on R. Bike: 5'   SLR: x10 B  SL bridge: x10 B   Sciatic nerve mobilization in long sitting with PF/DF: x10 with cervical flexion/extension. Advanced to no cervical motion to increase tension: x10. Lateral band walk with 80# band: x2 laps knees straight and bent each  SL RDL: x5 B  Lunges: x6 B   DL with 8# ball: x5  Squat with 8# ball: x8   Neuromuscular re-education                Manual Therapy:                Therapeutic Activities:                  Home program: 7/1/20: sciatic nerve mobilization, SLR, hip abduction, bridge    Med[employer] Portal  Treatment/Session Summary:    · Response to Treatment: Pt tolerated exercises well and continues to demonstrate progress. He was advanced to sciatic nerve mobilization with greater tension. · Communication/Consultation:  None today  · Equipment provided today:  None today  · Recommendations/Intent for next treatment session: Next visit will focus on improving pain-free knee AROM, normalizing neurodynamics of knee, and improving activity tolearnce.     Total Treatment Billable Duration:  40 minutes  PT Patient Time In/Time Out  Time In: 1620  Time Out: 24 [facility] Zacarias Archuleta, PT, DPT    Future Appointments   Date Time Provider Lizbeth Aldridge   7/20/2020  3:30 PM Gurpreet Bhandari [facility]   7/22/2020  2:45 PM Gurpreet Bhandari [facility]   7/27/2020  3:30 PM Gurpreet Bhandari [facility]   7/29/2020  2:45 PM Gurpreet Bhandari [facility]

## 2020-07-20 ENCOUNTER — HOSPITAL ENCOUNTER (OUTPATIENT)
Dept: PHYSICAL THERAPY | Age: 25
Discharge: HOME | End: 2020-07-20
Payer: COMMERCIAL

## 2020-07-20 PROCEDURE — 97110 THERAPEUTIC EXERCISES: CPT

## 2020-07-20 NOTE — PROGRESS NOTES
Yuki Sousa  : 1995  Primary: Jaja Beckman  Secondary:  [address] [place] @ [address], [address] [place] [address]  [phone]   [phone]      OUTPATIENT PHYSICAL THERAPY: Daily Treatment Note 2020  Visit Count:  6    ICD-10: Treatment Diagnosis: Pain in right knee (M25.561) and Stiffness of right knee, not elsewhere classified (M25.661)  TREATMENT PLAN:  Effective Dates: 2020 TO 2020 (30 days). Frequency/Duration: 2 times a week for 30 Day(s)  GOALS: (Goals have been discussed and agreed upon with patient.)  Short-Term Functional Goals: Time Frame: 2 weeks   # Goal Status   1 Pt will confirm compliance with home program to facilitate improvement in function. MET     Discharge goals: Time frame: 4 weeks   # Goal Status   1 Pt will be able to tolerate sitting without limitation with no significant increase in symptoms to participate in ADLs such as desk work. MET   2 Pt will increase walking tolerance to no limitation to be more mobile in community. NEW   3 Pt will have full knee extension without symptoms to be able to ambulate and perform functional mobility. NEW       Pre-treatment Symptoms/Complaints:  Pt reports his leg continues to feel good and improve. He reports that he has very little knee pain that only affects him sporadically. Pain: Initial:   0/10 Post Session:  0/10   Medications Last Reviewed: 2020  Updated Objective Findings: Below measures from initial evaluation unless otherwise noted. 20  Observation: Unremarkable  Edema: Knee edema measurements   Left circumference (cm) Right circumference (cm)   Mid-patella 45.5 46     ROM:    Left (°) Right (°)   Flexion 140 140   Extension 3 Lacking 2      Lower body ANDT: Slump (+)  SLR (+) R, sensitized with spinal flexion and DF   Strength: WNL B with mild discomfort on R with resisted knee flexion and hip ER.  Pt able to perform step up and down without compensation. Palpation: mild TTP with deep palpation of R posterior knee  LEFS: 63/80    TREATMENT:   THERAPEUTIC ACTIVITY: (see chart for minutes): Therapeutic activities per grid below to improve mobility, strength, balance and coordination. Required minimal visual, verbal and tactile cues to improve independence with functional mobility and activities. THERAPEUTIC EXERCISE: (see chart for minutes):  Exercises per grid below to improve mobility, strength, balance and coordination. Required minimal visual, verbal and tactile cues to promote proper body alignment and promote proper body mechanics. Progressed resistance, range, repetitions and complexity of movement as indicated. NEUROMUSCULAR RE-EDUCATION: (see chart for minutes):  Exercise/activities per grid below to improve balance, coordination, kinesthetic sense, posture, pain, and proprioception. Required minimal visual, verbal and tactile cues to promote static and dynamic balance in standing. MANUAL THERAPY: (see chart for minutes): Joint mobilization, Soft tissue mobilization, skin mobilization, and muscle energy techniques were utilized and necessary because of the patient's restricted joint motion, restricted motion of soft tissue and pain . MODALITIES: (see chart for minutes):      *  Cold Pack Therapy in order to provide analgesia. Date: 7/17/20 (visit 5) 7/20/20 (visit 6)        Modalities:                  Therapeutic Exercise: 40 min 40 min        Bike: 5'   SLR: x10 B  SL bridge: x10 B   Sciatic nerve mobilization in long sitting with PF/DF: x10 with cervical flexion/extension. Advanced to no cervical motion to increase tension: x10.   Lateral band walk with 80# band: x2 laps knees straight and bent each  SL RDL: x5 B  Lunges: x6 B   DL with 8# ball: x5  Squat with 8# ball: x8 Bike: 5'  SLR: x10 B   HS stretch with strap: x3 B  Sciatic nerve mobilization in long sitting with PF/DF: x15  Step-ups on 18\" box: x5 B  SL RDL: x5 B  Lateral band walk with 80# band: x2 laps knees straight and bent each  Squat with 8# ball: 2x8       Neuromuscular re-education                  Manual Therapy:                  Therapeutic Activities:                    Home program: 7/1/20: sciatic nerve mobilization, SLR, hip abduction, bridge    MedBridge Portal  Treatment/Session Summary:    · Response to Treatment: Pt continues to progress well and reports no pain with exercises. · Communication/Consultation:  None today  · Equipment provided today:  None today  · Recommendations/Intent for next treatment session: Next visit will focus on improving pain-free knee AROM, normalizing neurodynamics of knee, and improving activity tolearnce.     Total Treatment Billable Duration:  40 minutes  PT Patient Time In/Time Out  Time In: 6217  Time Out: 100 Nabil Mariee, PT, DPT    Future Appointments   Date Time Provider Lizbeth Aldridge   7/22/2020  2:45 PM Pascale Relic [facility]   7/27/2020  3:30 PM [facility]   7/29/2020  2:45 PM Pascale Relic [facility]

## 2020-07-22 ENCOUNTER — HOSPITAL ENCOUNTER (OUTPATIENT)
Dept: PHYSICAL THERAPY | Age: 25
Discharge: HOME | End: 2020-07-22
Payer: COMMERCIAL

## 2020-07-22 PROCEDURE — 97110 THERAPEUTIC EXERCISES: CPT

## 2020-07-22 NOTE — PROGRESS NOTES
Haile Pollard  : 1995  Primary: Nando Beckman  Secondary:  [address] @ [place] [address].  [phone]   [phone]      OUTPATIENT PHYSICAL THERAPY: Daily Treatment Note 2020  Visit Count:  7    ICD-10: Treatment Diagnosis: Pain in right knee (M25.561) and Stiffness of right knee, not elsewhere classified (M25.661)  TREATMENT PLAN:  Effective Dates: 2020 TO 2020 (30 days). Frequency/Duration: 2 times a week for 30 Day(s)  GOALS: (Goals have been discussed and agreed upon with patient.)  Short-Term Functional Goals: Time Frame: 2 weeks   # Goal Status   1 Pt will confirm compliance with home program to facilitate improvement in function. MET     Discharge goals: Time frame: 4 weeks   # Goal Status   1 Pt will be able to tolerate sitting without limitation with no significant increase in symptoms to participate in ADLs such as desk work. MET   2 Pt will increase walking tolerance to no limitation to be more mobile in community. NEW   3 Pt will have full knee extension without symptoms to be able to ambulate and perform functional mobility. NEW       Pre-treatment Symptoms/Complaints:  Pt reports that he thinks his leg pain is almost completely gone. He reports a few days he would get twinges but those have lessened now. Pain: Initial:   0/10 Post Session:  0/10   Medications Last Reviewed: 2020  Updated Objective Findings: Below measures from initial evaluation unless otherwise noted. 20  Observation: Unremarkable  Edema: Knee edema measurements   Left circumference (cm) Right circumference (cm)   Mid-patella 45.5 46     ROM:    Left (°) Right (°)   Flexion 140 140   Extension 3 Lacking 2      Lower body ANDT: Slump (+)  SLR (+) R, sensitized with spinal flexion and DF   Strength: WNL B with mild discomfort on R with resisted knee flexion and hip ER.  Pt able to perform step up and down without compensation. Palpation: mild TTP with deep palpation of R posterior knee  LEFS: 63/80    TREATMENT:   THERAPEUTIC ACTIVITY: (see chart for minutes): Therapeutic activities per grid below to improve mobility, strength, balance and coordination. Required minimal visual, verbal and tactile cues to improve independence with functional mobility and activities. THERAPEUTIC EXERCISE: (see chart for minutes):  Exercises per grid below to improve mobility, strength, balance and coordination. Required minimal visual, verbal and tactile cues to promote proper body alignment and promote proper body mechanics. Progressed resistance, range, repetitions and complexity of movement as indicated. NEUROMUSCULAR RE-EDUCATION: (see chart for minutes):  Exercise/activities per grid below to improve balance, coordination, kinesthetic sense, posture, pain, and proprioception. Required minimal visual, verbal and tactile cues to promote static and dynamic balance in standing. MANUAL THERAPY: (see chart for minutes): Joint mobilization, Soft tissue mobilization, skin mobilization, and muscle energy techniques were utilized and necessary because of the patient's restricted joint motion, restricted motion of soft tissue and pain . MODALITIES: (see chart for minutes):      *  Cold Pack Therapy in order to provide analgesia. Date: 7/17/20 (visit 5) 7/20/20 (visit 6)  7/22/20 (visit 7)       Modalities:                  Therapeutic Exercise: 40 min 40 min 40 min       Bike: 5'   SLR: x10 B  SL bridge: x10 B   Sciatic nerve mobilization in long sitting with PF/DF: x10 with cervical flexion/extension. Advanced to no cervical motion to increase tension: x10.   Lateral band walk with 80# band: x2 laps knees straight and bent each  SL RDL: x5 B  Lunges: x6 B   DL with 8# ball: x5  Squat with 8# ball: x8 Bike: 5'  SLR: x10 B   HS stretch with strap: x3 B  Sciatic nerve mobilization in long sitting with PF/DF: x15  Step-ups on 18\" box: x5 B  SL RDL: x5 B  Lateral band walk with 80# band: x2 laps knees straight and bent each  Squat with 8# ball: 2x8 Bike: 5'   SLR: 2x10 B   Sciatic nerve mobilization in long sitting with PF/DF: x10 with symmetrical ROM  Step-ups on 18\" box: 2x5 B  Sled push: 2x1 laps, arms straight and bent each  SL RDL: x5 B  Lateral band walk with 80# band: x laps knees straight and bent each      Neuromuscular re-education                  Manual Therapy:                  Therapeutic Activities:                    Home program: 7/1/20: sciatic nerve mobilization, SLR, hip abduction, bridge    Med[facility] Portal  Treatment/Session Summary:    · Response to Treatment: Pt tolerated all exercises well and continues to report improving function of knee. · Communication/Consultation:  None today  · Equipment provided today:  None today  · Recommendations/Intent for next treatment session: Next visit will focus on improving pain-free knee AROM, normalizing neurodynamics of knee, and improving activity tolearnce.     Total Treatment Billable Duration:  40 minutes  PT Patient Time In/Time Out  Time In: 1563  Time Out: 2463 [facility] Damaris Wilson, PT, DPT    Future Appointments   Date Time Provider Lizbeth Aldridge   7/27/2020  3:30 PM Dennys Marrero [facility]   7/29/2020  2:45 PM Dennys Marrero [facility]

## 2020-07-24 ENCOUNTER — APPOINTMENT (OUTPATIENT)
Dept: PHYSICAL THERAPY | Age: 25
End: 2020-07-24
Payer: COMMERCIAL

## 2020-07-27 ENCOUNTER — HOSPITAL ENCOUNTER (OUTPATIENT)
Dept: PHYSICAL THERAPY | Age: 25
Discharge: HOME | End: 2020-07-27
Payer: COMMERCIAL

## 2020-07-27 PROCEDURE — 97110 THERAPEUTIC EXERCISES: CPT

## 2020-07-27 NOTE — PROGRESS NOTES
Leslee Ramirez  : 1995  Primary: [employer]  Secondary:  [address] @ [place] [address].  [phone]   [phone]      OUTPATIENT PHYSICAL THERAPY: Daily Treatment Note 2020  Visit Count:  8    ICD-10: Treatment Diagnosis: Pain in right knee (M25.561) and Stiffness of right knee, not elsewhere classified (M25.661)  TREATMENT PLAN:  Effective Dates: 2020 TO 2020 (30 days). Frequency/Duration: 2 times a week for 30 Day(s)  GOALS: (Goals have been discussed and agreed upon with patient.)  Short-Term Functional Goals: Time Frame: 2 weeks   # Goal Status   1 Pt will confirm compliance with home program to facilitate improvement in function. MET     Discharge goals: Time frame: 4 weeks   # Goal Status   1 Pt will be able to tolerate sitting without limitation with no significant increase in symptoms to participate in ADLs such as desk work. MET   2 Pt will increase walking tolerance to no limitation to be more mobile in community. MET   3 Pt will have full knee extension without symptoms to be able to ambulate and perform functional mobility. MET       Pre-treatment Symptoms/Complaints:  Pt reports he feels no limitation with mobility now. He reports that he feels a twinge in knee every now and then while lying down but it then goes away in a few seconds. Pain: Initial:   0/10 Post Session:  0/10   Medications Last Reviewed: 2020  Updated Objective Findings: Below measures from initial evaluation unless otherwise noted. 20  Observation: Unremarkable  Edema: Knee edema measurements   Left circumference (cm) Right circumference (cm)   Mid-patella 45.5 46     ROM:    Left (°) Right (°)   Flexion 140 140   Extension 3 Lacking 2      Lower body ANDT: Slump (+)  SLR (+) R, sensitized with spinal flexion and DF   Strength: WNL B with mild discomfort on R with resisted knee flexion and hip ER.  Pt able to perform step up and down without compensation. Palpation: mild TTP with deep palpation of R posterior knee  LEFS: 63/80    TREATMENT:   THERAPEUTIC ACTIVITY: (see chart for minutes): Therapeutic activities per grid below to improve mobility, strength, balance and coordination. Required minimal visual, verbal and tactile cues to improve independence with functional mobility and activities. THERAPEUTIC EXERCISE: (see chart for minutes):  Exercises per grid below to improve mobility, strength, balance and coordination. Required minimal visual, verbal and tactile cues to promote proper body alignment and promote proper body mechanics. Progressed resistance, range, repetitions and complexity of movement as indicated. NEUROMUSCULAR RE-EDUCATION: (see chart for minutes):  Exercise/activities per grid below to improve balance, coordination, kinesthetic sense, posture, pain, and proprioception. Required minimal visual, verbal and tactile cues to promote static and dynamic balance in standing. MANUAL THERAPY: (see chart for minutes): Joint mobilization, Soft tissue mobilization, skin mobilization, and muscle energy techniques were utilized and necessary because of the patient's restricted joint motion, restricted motion of soft tissue and pain . MODALITIES: (see chart for minutes):      *  Cold Pack Therapy in order to provide analgesia. Date: 7/17/20 (visit 5) 7/20/20 (visit 6)  7/22/20 (visit 7)  7/27/20 (visit 8)      Modalities:                  Therapeutic Exercise: 40 min 40 min 40 min 40 min      Bike: 5'   SLR: x10 B  SL bridge: x10 B   Sciatic nerve mobilization in long sitting with PF/DF: x10 with cervical flexion/extension. Advanced to no cervical motion to increase tension: x10.   Lateral band walk with 80# band: x2 laps knees straight and bent each  SL RDL: x5 B  Lunges: x6 B   DL with 8# ball: x5  Squat with 8# ball: x8 Bike: 5'  SLR: x10 B   HS stretch with strap: x3 B  Sciatic nerve mobilization in long sitting with PF/DF: x15  Step-ups on 18\" box: x5 B  SL RDL: x5 B  Lateral band walk with 80# band: x2 laps knees straight and bent each  Squat with 8# ball: 2x8 Bike: 5'   SLR: 2x10 B   Sciatic nerve mobilization in long sitting with PF/DF: x10 with symmetrical ROM  Step-ups on 18\" box: 2x5 B  Sled push: 2x1 laps, arms straight and bent each  SL RDL: x5 B  Lateral band walk with 80# band: x laps knees straight and bent each Bike: 5'   SLR: 2x10 B   Sciatic nerve mobilization in long sitting with PF/DF: x10 with symmetrical ROM  Step-ups on 18\" box: x5 B  DL: 2x5x85#  SL RDL: x5 B  Lateral band walk with 80# band: x2 laps knees straight and bent each     Neuromuscular re-education                  Manual Therapy:                  Therapeutic Activities:                    Home program: 7/1/20: sciatic nerve mobilization, SLR, hip abduction, bridge    MedBridge Portal  Treatment/Session Summary:    · Response to Treatment: Pt tolerated exercises well and reported no pain. He reports no limitation with daily activities and mobility now. · Communication/Consultation:  None today  · Equipment provided today:  None today  · Recommendations/Intent for next treatment session: Next visit will focus on improving activity tolearnce.     Total Treatment Billable Duration:  40 minutes  PT Patient Time In/Time Out  Time In: 1949  Time Out: [address] Judy Rushing, PT, DPT    Future Appointments   Date Time Provider Lizbeth Aldridge   7/29/2020  2:45 PM Adrianna Coelho [facility]

## 2020-07-29 ENCOUNTER — HOSPITAL ENCOUNTER (OUTPATIENT)
Dept: PHYSICAL THERAPY | Age: 25
Discharge: HOME | End: 2020-07-29
Payer: COMMERCIAL

## 2020-07-29 PROCEDURE — 97110 THERAPEUTIC EXERCISES: CPT

## 2020-07-29 NOTE — PROGRESS NOTES
Wallace Lugo  : 1995  Primary: [employer]  Secondary:  [address] @ [address], [address] [place] [address]  [phone]   [phone]      OUTPATIENT PHYSICAL THERAPY: Daily Treatment and discharge Note 2020  Visit Count:  9    ICD-10: Treatment Diagnosis: Pain in right knee (M25.561) and Stiffness of right knee, not elsewhere classified (M25.661)  TREATMENT PLAN:  Effective Dates: 2020 TO 2020 (30 days). Frequency/Duration: 2 times a week for 30 Day(s)  GOALS: (Goals have been discussed and agreed upon with patient.)  Short-Term Functional Goals: Time Frame: 2 weeks   # Goal Status   1 Pt will confirm compliance with home program to facilitate improvement in function. MET     Discharge goals: Time frame: 4 weeks   # Goal Status   1 Pt will be able to tolerate sitting without limitation with no significant increase in symptoms to participate in ADLs such as desk work. MET   2 Pt will increase walking tolerance to no limitation to be more mobile in community. MET   3 Pt will have full knee extension without symptoms to be able to ambulate and perform functional mobility. MET       Pre-treatment Symptoms/Complaints:  Pt reports his knee feels like its back to normal. He reports even the twinges he would occasionally feel at rest seem to have gotten better. Pain: Initial:   0/10 Post Session:  0/10   Medications Last Reviewed: 2020  Updated Objective Findings: Below measures from initial evaluation unless otherwise noted. 20  Observation: Unremarkable  Edema: Knee edema measurements   Left circumference (cm) Right circumference (cm)   Mid-patella 45.5 46     ROM:    Left (°) Right (°)   Flexion 140 140   Extension 3 Lacking 2      Lower body ANDT: Slump (+)  SLR (+) R, sensitized with spinal flexion and DF   Strength: WNL B with mild discomfort on R with resisted knee flexion and hip ER.  Pt able to perform step up and down without compensation. Palpation: mild TTP with deep palpation of R posterior knee  LEFS: 63/80    7/29/20:   R knee flexion and extension WNL and pain-free. Slump in long-sitting (-) on R for tension and symmetrical to L side. Pt completes all exercises without pain or limitation. LEFS: 79/80  TREATMENT:   THERAPEUTIC ACTIVITY: (see chart for minutes): Therapeutic activities per grid below to improve mobility, strength, balance and coordination. Required minimal visual, verbal and tactile cues to improve independence with functional mobility and activities. THERAPEUTIC EXERCISE: (see chart for minutes):  Exercises per grid below to improve mobility, strength, balance and coordination. Required minimal visual, verbal and tactile cues to promote proper body alignment and promote proper body mechanics. Progressed resistance, range, repetitions and complexity of movement as indicated. NEUROMUSCULAR RE-EDUCATION: (see chart for minutes):  Exercise/activities per grid below to improve balance, coordination, kinesthetic sense, posture, pain, and proprioception. Required minimal visual, verbal and tactile cues to promote static and dynamic balance in standing. MANUAL THERAPY: (see chart for minutes): Joint mobilization, Soft tissue mobilization, skin mobilization, and muscle energy techniques were utilized and necessary because of the patient's restricted joint motion, restricted motion of soft tissue and pain . MODALITIES: (see chart for minutes):      *  Cold Pack Therapy in order to provide analgesia. Date: 7/17/20 (visit 5) 7/20/20 (visit 6)  7/22/20 (visit 7)  7/27/20 (visit 8)  7/29/20 (visit 9)     Modalities:                  Therapeutic Exercise: 40 min 40 min 40 min 40 min 40 min     Bike: 5'   SLR: x10 B  SL bridge: x10 B   Sciatic nerve mobilization in long sitting with PF/DF: x10 with cervical flexion/extension. Advanced to no cervical motion to increase tension: x10.   Lateral band walk with 80# band: x2 laps knees straight and bent each  SL RDL: x5 B  Lunges: x6 B   DL with 8# ball: x5  Squat with 8# ball: x8 Bike: 5'  SLR: x10 B   HS stretch with strap: x3 B  Sciatic nerve mobilization in long sitting with PF/DF: x15  Step-ups on 18\" box: x5 B  SL RDL: x5 B  Lateral band walk with 80# band: x2 laps knees straight and bent each  Squat with 8# ball: 2x8 Bike: 5'   SLR: 2x10 B   Sciatic nerve mobilization in long sitting with PF/DF: x10 with symmetrical ROM  Step-ups on 18\" box: 2x5 B  Sled push: 2x1 laps, arms straight and bent each  SL RDL: x5 B  Lateral band walk with 80# band: x laps knees straight and bent each Bike: 5'   SLR: 2x10 B   Sciatic nerve mobilization in long sitting with PF/DF: x10 with symmetrical ROM  Step-ups on 18\" box: x5 B  DL: 2x5x85#  SL RDL: x5 B  Lateral band walk with 80# band: x2 laps knees straight and bent each Bike: 5'   SLR: x10 B   Sciatic nerve mobilization in long sitting with PF/DF: x10 with symmetrical ROM  Step-ups on 18\" box: x5 B  DL: 5x85#  Sled push: 1 lap, arms straight and bent each  SL RDL: x5 B  Lateral band walk with 80# band: x2 laps knees straight and bent each    Neuromuscular re-education                  Manual Therapy:                  Therapeutic Activities:                    Home program: 7/1/20: sciatic nerve mobilization, SLR, hip abduction, bridge    [facility] Portal  Treatment/Session Summary:    · Response to Treatment: Pt tolerated exercises well. He has met all goals and reports no limitation or pain with all ADLs and exercises. He is being discharged to home program which he confirmed understanding of. · Communication/Consultation:  None today  · Equipment provided today:  None today  · Recommendations: D/C to home program    Total Treatment Billable Duration:  40 minutes  PT Patient Time In/Time Out  Time In: 1530  Time Out: [address], PT, DPT    No future appointments.

## 2020-07-31 ENCOUNTER — APPOINTMENT (OUTPATIENT)
Dept: PHYSICAL THERAPY | Age: 25
End: 2020-07-31
Payer: COMMERCIAL

## 2021-08-12 ENCOUNTER — HOSPITAL ENCOUNTER (OUTPATIENT)
Dept: OCCUPATIONAL MEDICINE | Age: 26
Discharge: HOME | End: 2021-08-12

## 2021-08-12 ENCOUNTER — TRANSCRIBE ORDER (OUTPATIENT)
Dept: OCCUPATIONAL MEDICINE | Age: 26
End: 2021-08-12

## 2021-08-12 DIAGNOSIS — T14.90XA INJURY: Primary | ICD-10-CM

## 2021-08-12 DIAGNOSIS — T14.90XA INJURY: ICD-10-CM

## 2021-09-21 RX ORDER — CEFAZOLIN SODIUM/WATER 2 G/20 ML
2 SYRINGE (ML) INTRAVENOUS ONCE
Status: CANCELLED | OUTPATIENT
Start: 2021-09-21 | End: 2021-09-21

## 2021-09-27 ENCOUNTER — HOSPITAL ENCOUNTER (OUTPATIENT)
Dept: SURGERY | Age: 26
Discharge: HOME | End: 2021-09-27
Attending: ORTHOPAEDIC SURGERY
Payer: COMMERCIAL

## 2021-09-27 VITALS
BODY MASS INDEX: 37.19 KG/M2 | WEIGHT: 315 LBS | OXYGEN SATURATION: 98 % | TEMPERATURE: 97.7 F | SYSTOLIC BLOOD PRESSURE: 149 MMHG | HEIGHT: 77 IN | RESPIRATION RATE: 16 BRPM | HEART RATE: 67 BPM | DIASTOLIC BLOOD PRESSURE: 79 MMHG

## 2021-09-27 LAB
BACTERIA SPEC CULT: ABNORMAL
SERVICE CMNT-IMP: ABNORMAL

## 2021-09-27 PROCEDURE — 87641 MR-STAPH DNA AMP PROBE: CPT

## 2021-09-27 RX ORDER — IBUPROFEN 200 MG
200 TABLET ORAL
COMMUNITY

## 2021-09-27 NOTE — PERIOP NOTES
Patient verified name, , and surgery as listed in [facility]. Patient provided medical/health information and PTA medications to the best of their ability. TYPE  CASE: 1B  Orders per surgeon: received  Labs per surgeon: MRSA/MSSA. Results: pending  Labs per anesthesia protocol: No additional.  EKG:  Not needed at time of PAT    Patient COVID test date 10/01/2021; The testing center is located at the . [address] , [place]. If appointment is needed patient provided telephone number of [phone]. Nasal Swab collected per MD order. Patient provided with and instructed on education handouts including Guide to Surgery, blood transfusions, pain management, and hand hygiene for the family and community, and [facility] brochure. Hibiclens and instructions given per hospital policy. Original medication prescription bottles not visualized during patient appointment. Patient teach back successful and patient demonstrates knowledge of instruction. PLEASE CONTINUE TAKING ALL PRESCRIPTION MEDICATIONS UP TO THE DAY OF SURGERY UNLESS OTHERWISE DIRECTED BELOW. DISCONTINUE all vitamins and supplements 7 days prior to surgery. DISCONTINUE Non-Steriodal Anti-Inflammatory (NSAIDS) such as Advil and Aleve 5 days prior to surgery. Home Medications to take  the day of surgery               Home Medications   to Hold     Ibuprofen         Comments    Covid test 10/1/2021 @ [address]   On the day before surgery please take Acetaminophen 1000mg in the morning and then again before bed. You may substitute for Tylenol 650 mg. Hibiclens shower the night before surgery and the morning of surgery. Please do not bring home medications with you on the day of surgery unless otherwise directed by your nurse.   If you are instructed to bring home medications, please give them to your nurse as they will be administered by the nursing staff. If you have any questions, please call [facility] Latanya Lima [phone] or [facility] [phone]. A copy of this note was provided to the patient for reference.

## 2021-09-29 NOTE — PERIOP NOTES
MSSA/MRSA SC BY PCR, NASAL SWAB [[id number]] (Order [id number])  Microbiology  Date: 9/27/2021 Department: [facility]a [facility] Or Pre Assessment Released By: Qing Oh (auto-released) Authorizing: Eriberto Batres MD   Contains abnormal data MSSA/MRSA SC BY PCR, NASAL SWAB  Order: [id number]  Collected:  9/27/2021 09:39 Status:  Final result  Specimen Information: Swab         0 Result Notes   Ref Range & Units 9/27/21 0939   Special Requests:   NO SPECIAL REQUESTS    Culture result:   Abnormal   MRSA target DNA not detected, SA target DNA detected.   A MRSA negative, SA positive test result does not preclude MRSA nasal colonization.    [address] [facility].         Specimen Collected: 09/27/21 09:39 Last Resulted: 09/27/21 20:18       Order Details     Lab and Collection Details     Routing     Result History              Specimen Information    Swab     Collected: 9/27/2021 [id number]

## 2021-10-04 ENCOUNTER — ANESTHESIA EVENT (OUTPATIENT)
Dept: SURGERY | Age: 26
End: 2021-10-04
Payer: COMMERCIAL

## 2021-10-05 ENCOUNTER — APPOINTMENT (OUTPATIENT)
Dept: GENERAL RADIOLOGY | Age: 26
End: 2021-10-05
Attending: ORTHOPAEDIC SURGERY
Payer: COMMERCIAL

## 2021-10-05 ENCOUNTER — HOSPITAL ENCOUNTER (OUTPATIENT)
Age: 26
Setting detail: OUTPATIENT SURGERY
Discharge: HOME | End: 2021-10-05
Attending: ORTHOPAEDIC SURGERY | Admitting: ORTHOPAEDIC SURGERY
Payer: COMMERCIAL

## 2021-10-05 ENCOUNTER — ANESTHESIA (OUTPATIENT)
Dept: SURGERY | Age: 26
End: 2021-10-05
Payer: COMMERCIAL

## 2021-10-05 VITALS
HEIGHT: 77 IN | DIASTOLIC BLOOD PRESSURE: 78 MMHG | WEIGHT: 315 LBS | TEMPERATURE: 98.5 F | HEART RATE: 79 BPM | SYSTOLIC BLOOD PRESSURE: 145 MMHG | RESPIRATION RATE: 19 BRPM | OXYGEN SATURATION: 97 % | BODY MASS INDEX: 37.19 KG/M2

## 2021-10-05 DIAGNOSIS — M54.16 LUMBAR RADICULOPATHY: Primary | ICD-10-CM

## 2021-10-05 PROBLEM — M51.26 LUMBAR DISC HERNIATION: Status: ACTIVE | Noted: 2021-10-05

## 2021-10-05 PROCEDURE — 74011250637 HC RX REV CODE- 250/637: Performed by: ORTHOPAEDIC SURGERY

## 2021-10-05 PROCEDURE — 77030031139 HC SUT VCRL2 J&J -A: Performed by: ORTHOPAEDIC SURGERY

## 2021-10-05 PROCEDURE — 77030034479 HC ADH SKN CLSR PRINEO J&J -B: Performed by: ORTHOPAEDIC SURGERY

## 2021-10-05 PROCEDURE — 74011250636 HC RX REV CODE- 250/636: Performed by: ORTHOPAEDIC SURGERY

## 2021-10-05 PROCEDURE — 74011000250 HC RX REV CODE- 250: Performed by: ORTHOPAEDIC SURGERY

## 2021-10-05 PROCEDURE — 76060000033 HC ANESTHESIA 1 TO 1.5 HR: Performed by: ORTHOPAEDIC SURGERY

## 2021-10-05 PROCEDURE — 63047 LAM FACETEC & FORAMOT LUMBAR: CPT | Performed by: ORTHOPAEDIC SURGERY

## 2021-10-05 PROCEDURE — 77030039425 HC BLD LARYNG TRULITE DISP TELE -A: Performed by: NURSE ANESTHETIST, CERTIFIED REGISTERED

## 2021-10-05 PROCEDURE — 76210000016 HC OR PH I REC 1 TO 1.5 HR: Performed by: ORTHOPAEDIC SURGERY

## 2021-10-05 PROCEDURE — 77030018673: Performed by: ORTHOPAEDIC SURGERY

## 2021-10-05 PROCEDURE — 74011250637 HC RX REV CODE- 250/637: Performed by: ANESTHESIOLOGY

## 2021-10-05 PROCEDURE — 76210000021 HC REC RM PH II 0.5 TO 1 HR: Performed by: ORTHOPAEDIC SURGERY

## 2021-10-05 PROCEDURE — 74011000272 HC RX REV CODE- 272: Performed by: ORTHOPAEDIC SURGERY

## 2021-10-05 PROCEDURE — 77030012894: Performed by: ORTHOPAEDIC SURGERY

## 2021-10-05 PROCEDURE — 74011250636 HC RX REV CODE- 250/636: Performed by: ANESTHESIOLOGY

## 2021-10-05 PROCEDURE — 2709999900 HC NON-CHARGEABLE SUPPLY: Performed by: ORTHOPAEDIC SURGERY

## 2021-10-05 PROCEDURE — 77030025623 HC BUR RND PRECIS STRY -D: Performed by: ORTHOPAEDIC SURGERY

## 2021-10-05 PROCEDURE — 77030040361 HC SLV COMPR DVT MDII -B: Performed by: ORTHOPAEDIC SURGERY

## 2021-10-05 PROCEDURE — 77030028270 HC SRGFL HEMSTAT MTRX J&J -C: Performed by: ORTHOPAEDIC SURGERY

## 2021-10-05 PROCEDURE — 77030037088 HC TUBE ENDOTRACH ORAL NSL COVD-A: Performed by: NURSE ANESTHETIST, CERTIFIED REGISTERED

## 2021-10-05 PROCEDURE — 77030029099 HC BN WAX SSPC -A: Performed by: ORTHOPAEDIC SURGERY

## 2021-10-05 PROCEDURE — 76010000161 HC OR TIME 1 TO 1.5 HR INTENSV-TIER 1: Performed by: ORTHOPAEDIC SURGERY

## 2021-10-05 PROCEDURE — 74011000250 HC RX REV CODE- 250: Performed by: NURSE ANESTHETIST, CERTIFIED REGISTERED

## 2021-10-05 PROCEDURE — 74011250636 HC RX REV CODE- 250/636: Performed by: NURSE ANESTHETIST, CERTIFIED REGISTERED

## 2021-10-05 PROCEDURE — 77030040922 HC BLNKT HYPOTHRM STRY -A: Performed by: ANESTHESIOLOGY

## 2021-10-05 RX ORDER — MIDAZOLAM HYDROCHLORIDE 1 MG/ML
2 INJECTION, SOLUTION INTRAMUSCULAR; INTRAVENOUS ONCE
Status: DISCONTINUED | OUTPATIENT
Start: 2021-10-05 | End: 2021-10-05 | Stop reason: HOSPADM

## 2021-10-05 RX ORDER — VANCOMYCIN HYDROCHLORIDE 1 G/20ML
INJECTION, POWDER, LYOPHILIZED, FOR SOLUTION INTRAVENOUS AS NEEDED
Status: DISCONTINUED | OUTPATIENT
Start: 2021-10-05 | End: 2021-10-05 | Stop reason: HOSPADM

## 2021-10-05 RX ORDER — SODIUM CHLORIDE, SODIUM LACTATE, POTASSIUM CHLORIDE, CALCIUM CHLORIDE 600; 310; 30; 20 MG/100ML; MG/100ML; MG/100ML; MG/100ML
75 INJECTION, SOLUTION INTRAVENOUS CONTINUOUS
Status: DISCONTINUED | OUTPATIENT
Start: 2021-10-05 | End: 2021-10-05 | Stop reason: HOSPADM

## 2021-10-05 RX ORDER — SUCCINYLCHOLINE CHLORIDE 20 MG/ML
INJECTION INTRAMUSCULAR; INTRAVENOUS AS NEEDED
Status: DISCONTINUED | OUTPATIENT
Start: 2021-10-05 | End: 2021-10-05 | Stop reason: HOSPADM

## 2021-10-05 RX ORDER — ACETAMINOPHEN 500 MG
1000 TABLET ORAL ONCE
Status: COMPLETED | OUTPATIENT
Start: 2021-10-05 | End: 2021-10-05

## 2021-10-05 RX ORDER — KETAMINE HYDROCHLORIDE 50 MG/ML
INJECTION, SOLUTION INTRAMUSCULAR; INTRAVENOUS AS NEEDED
Status: DISCONTINUED | OUTPATIENT
Start: 2021-10-05 | End: 2021-10-05 | Stop reason: HOSPADM

## 2021-10-05 RX ORDER — ONDANSETRON 2 MG/ML
INJECTION INTRAMUSCULAR; INTRAVENOUS AS NEEDED
Status: DISCONTINUED | OUTPATIENT
Start: 2021-10-05 | End: 2021-10-05 | Stop reason: HOSPADM

## 2021-10-05 RX ORDER — NEOSTIGMINE METHYLSULFATE 1 MG/ML
INJECTION, SOLUTION INTRAVENOUS AS NEEDED
Status: DISCONTINUED | OUTPATIENT
Start: 2021-10-05 | End: 2021-10-05 | Stop reason: HOSPADM

## 2021-10-05 RX ORDER — LIDOCAINE HYDROCHLORIDE 20 MG/ML
INJECTION, SOLUTION EPIDURAL; INFILTRATION; INTRACAUDAL; PERINEURAL AS NEEDED
Status: DISCONTINUED | OUTPATIENT
Start: 2021-10-05 | End: 2021-10-05 | Stop reason: HOSPADM

## 2021-10-05 RX ORDER — BUPIVACAINE HYDROCHLORIDE AND EPINEPHRINE 5; 5 MG/ML; UG/ML
INJECTION, SOLUTION EPIDURAL; INTRACAUDAL; PERINEURAL AS NEEDED
Status: DISCONTINUED | OUTPATIENT
Start: 2021-10-05 | End: 2021-10-05 | Stop reason: HOSPADM

## 2021-10-05 RX ORDER — ROCURONIUM BROMIDE 10 MG/ML
INJECTION, SOLUTION INTRAVENOUS AS NEEDED
Status: DISCONTINUED | OUTPATIENT
Start: 2021-10-05 | End: 2021-10-05 | Stop reason: HOSPADM

## 2021-10-05 RX ORDER — MIDAZOLAM HYDROCHLORIDE 1 MG/ML
2 INJECTION, SOLUTION INTRAMUSCULAR; INTRAVENOUS
Status: DISCONTINUED | OUTPATIENT
Start: 2021-10-05 | End: 2021-10-05 | Stop reason: HOSPADM

## 2021-10-05 RX ORDER — ACETAMINOPHEN 500 MG
1000 TABLET ORAL
COMMUNITY

## 2021-10-05 RX ORDER — SODIUM CHLORIDE, SODIUM LACTATE, POTASSIUM CHLORIDE, CALCIUM CHLORIDE 600; 310; 30; 20 MG/100ML; MG/100ML; MG/100ML; MG/100ML
25 INJECTION, SOLUTION INTRAVENOUS CONTINUOUS
Status: DISCONTINUED | OUTPATIENT
Start: 2021-10-05 | End: 2021-10-05 | Stop reason: HOSPADM

## 2021-10-05 RX ORDER — OXYCODONE HYDROCHLORIDE 5 MG/1
5 TABLET ORAL
Status: COMPLETED | OUTPATIENT
Start: 2021-10-05 | End: 2021-10-05

## 2021-10-05 RX ORDER — FENTANYL CITRATE 50 UG/ML
INJECTION, SOLUTION INTRAMUSCULAR; INTRAVENOUS AS NEEDED
Status: DISCONTINUED | OUTPATIENT
Start: 2021-10-05 | End: 2021-10-05 | Stop reason: HOSPADM

## 2021-10-05 RX ORDER — DEXAMETHASONE SODIUM PHOSPHATE 4 MG/ML
INJECTION, SOLUTION INTRA-ARTICULAR; INTRALESIONAL; INTRAMUSCULAR; INTRAVENOUS; SOFT TISSUE AS NEEDED
Status: DISCONTINUED | OUTPATIENT
Start: 2021-10-05 | End: 2021-10-05 | Stop reason: HOSPADM

## 2021-10-05 RX ORDER — SCOLOPAMINE TRANSDERMAL SYSTEM 1 MG/1
1 PATCH, EXTENDED RELEASE TRANSDERMAL ONCE
Status: DISCONTINUED | OUTPATIENT
Start: 2021-10-05 | End: 2021-10-05 | Stop reason: HOSPADM

## 2021-10-05 RX ORDER — GLYCOPYRROLATE 0.2 MG/ML
INJECTION INTRAMUSCULAR; INTRAVENOUS AS NEEDED
Status: DISCONTINUED | OUTPATIENT
Start: 2021-10-05 | End: 2021-10-05 | Stop reason: HOSPADM

## 2021-10-05 RX ORDER — HALOPERIDOL 5 MG/ML
1 INJECTION INTRAMUSCULAR
Status: COMPLETED | OUTPATIENT
Start: 2021-10-05 | End: 2021-10-05

## 2021-10-05 RX ORDER — HYDROMORPHONE HYDROCHLORIDE 2 MG/ML
0.5 INJECTION, SOLUTION INTRAMUSCULAR; INTRAVENOUS; SUBCUTANEOUS
Status: DISCONTINUED | OUTPATIENT
Start: 2021-10-05 | End: 2021-10-05 | Stop reason: HOSPADM

## 2021-10-05 RX ORDER — LIDOCAINE HYDROCHLORIDE 10 MG/ML
0.1 INJECTION INFILTRATION; PERINEURAL AS NEEDED
Status: DISCONTINUED | OUTPATIENT
Start: 2021-10-05 | End: 2021-10-05 | Stop reason: HOSPADM

## 2021-10-05 RX ORDER — OXYCODONE HYDROCHLORIDE 5 MG/1
5 TABLET ORAL
Qty: 30 TABLET | Refills: 0 | Status: SHIPPED | OUTPATIENT
Start: 2021-10-05 | End: 2021-10-10

## 2021-10-05 RX ORDER — NALOXONE HYDROCHLORIDE 0.4 MG/ML
0.2 INJECTION, SOLUTION INTRAMUSCULAR; INTRAVENOUS; SUBCUTANEOUS AS NEEDED
Status: DISCONTINUED | OUTPATIENT
Start: 2021-10-05 | End: 2021-10-05 | Stop reason: HOSPADM

## 2021-10-05 RX ORDER — PROPOFOL 10 MG/ML
INJECTION, EMULSION INTRAVENOUS AS NEEDED
Status: DISCONTINUED | OUTPATIENT
Start: 2021-10-05 | End: 2021-10-05 | Stop reason: HOSPADM

## 2021-10-05 RX ORDER — FENTANYL CITRATE 50 UG/ML
100 INJECTION, SOLUTION INTRAMUSCULAR; INTRAVENOUS ONCE
Status: DISCONTINUED | OUTPATIENT
Start: 2021-10-05 | End: 2021-10-05 | Stop reason: HOSPADM

## 2021-10-05 RX ORDER — NALOXONE HYDROCHLORIDE 0.4 MG/ML
0.2 INJECTION, SOLUTION INTRAMUSCULAR; INTRAVENOUS; SUBCUTANEOUS
Status: DISCONTINUED | OUTPATIENT
Start: 2021-10-05 | End: 2021-10-05 | Stop reason: HOSPADM

## 2021-10-05 RX ADMIN — ROCURONIUM BROMIDE 30 MG: 10 INJECTION, SOLUTION INTRAVENOUS at 09:30

## 2021-10-05 RX ADMIN — LIDOCAINE HYDROCHLORIDE 80 MG: 20 INJECTION, SOLUTION EPIDURAL; INFILTRATION; INTRACAUDAL; PERINEURAL at 09:23

## 2021-10-05 RX ADMIN — ROCURONIUM BROMIDE 10 MG: 10 INJECTION, SOLUTION INTRAVENOUS at 09:23

## 2021-10-05 RX ADMIN — ROCURONIUM BROMIDE 10 MG: 10 INJECTION, SOLUTION INTRAVENOUS at 09:45

## 2021-10-05 RX ADMIN — FENTANYL CITRATE 100 MCG: 50 INJECTION INTRAMUSCULAR; INTRAVENOUS at 09:23

## 2021-10-05 RX ADMIN — GLYCOPYRROLATE 0.6 MG: 0.2 INJECTION, SOLUTION INTRAMUSCULAR; INTRAVENOUS at 10:20

## 2021-10-05 RX ADMIN — ONDANSETRON 4 MG: 2 INJECTION INTRAMUSCULAR; INTRAVENOUS at 09:43

## 2021-10-05 RX ADMIN — OXYCODONE 5 MG: 5 TABLET ORAL at 11:08

## 2021-10-05 RX ADMIN — ACETAMINOPHEN 1000 MG: 500 TABLET ORAL at 08:07

## 2021-10-05 RX ADMIN — Medication 4 MG: at 10:20

## 2021-10-05 RX ADMIN — KETAMINE HYDROCHLORIDE 30 MG: 50 INJECTION INTRAMUSCULAR; INTRAVENOUS at 10:00

## 2021-10-05 RX ADMIN — HYDROMORPHONE HYDROCHLORIDE 0.5 MG: 2 INJECTION, SOLUTION INTRAMUSCULAR; INTRAVENOUS; SUBCUTANEOUS at 10:55

## 2021-10-05 RX ADMIN — CEFAZOLIN 3 G: 1 INJECTION, POWDER, FOR SOLUTION INTRAVENOUS at 09:40

## 2021-10-05 RX ADMIN — Medication 3 AMPULE: at 08:02

## 2021-10-05 RX ADMIN — SODIUM CHLORIDE, SODIUM LACTATE, POTASSIUM CHLORIDE, AND CALCIUM CHLORIDE 25 ML/HR: 600; 310; 30; 20 INJECTION, SOLUTION INTRAVENOUS at 08:06

## 2021-10-05 RX ADMIN — HALOPERIDOL LACTATE 1 MG: 5 INJECTION, SOLUTION INTRAMUSCULAR at 11:06

## 2021-10-05 RX ADMIN — PROPOFOL 280 MG: 10 INJECTION, EMULSION INTRAVENOUS at 09:23

## 2021-10-05 RX ADMIN — KETAMINE HYDROCHLORIDE 50 MG: 50 INJECTION INTRAMUSCULAR; INTRAVENOUS at 09:27

## 2021-10-05 RX ADMIN — DEXAMETHASONE SODIUM PHOSPHATE 4 MG: 4 INJECTION, SOLUTION INTRAMUSCULAR; INTRAVENOUS at 09:43

## 2021-10-05 RX ADMIN — SUCCINYLCHOLINE CHLORIDE 160 MG: 20 INJECTION, SOLUTION INTRAMUSCULAR; INTRAVENOUS at 09:23

## 2021-10-05 NOTE — DISCHARGE INSTRUCTIONS
Discharge Instructions    Wound Care and Showering  Your wound will typically be covered with a clear plastic dressing when you go home from the hospital. Since it is transparent, you will see the underlying gauze turn red with blood which is normal. You do not need to change the dressing unless it is leaking from the edges. Otherwise leave this dressing in place. The clear plastic dressing is waterproof so you can take a shower while it is on. You may remove the clear plastic dressing and the underlying gauze 3 days after surgery. There will be small tape strips under the gauze which should be left in place. If there is no leaking from the wound, you may take a shower and allow the tape strips to get wet. Some of them may fall off. The remaining strips will be removed once you return to the office. If there is persistent leaking when you first remove the clear dressing, apply new gauze and new clear plastic dressing (typically purchased at a pharmacy) over the wound. Hair washing is permissible in the shower. No tub baths, hot tubs or whirlpools until seen in the office. If any of the following should occur, please call the office:    -Persistent drainage from the incision site.  -Opening of incisions  -Fevers greater than 101 degrees  -Flu-like symptoms  -Increased redness    Exercise  You have unlimited walking and stair climbing privileges. Walking outside or walking on a treadmill without an incline is also allowed. Do NOT lift anything weighing greater than 10-15 lbs. Especially try to avoid lifting or reaching above your head. Sleeping  You may sleep in any comfortable position. Many patients find comfort sleeping in a recliner chair. It is normal to have difficulty sleeping for the first several weeks following your surgery. We recommend trying Benadryl, Melatonin, or Tylenol PM for help sleeping. All are over-the-counter and can be found in drugstores.      Eating  Because of the tubes in your throat while asleep during surgery, it is normal to have a sore throat and some difficulty swallowing solid foods after your surgery. This may persist for several weeks. Eating soft foods like yogurt, macaroni, and mashed potatoes seem to help. Today, you may have bland foods, nothing spicy or greasy. Pain  If you feel you need pain medicine, you may take regular or extra-strength Tylenol. Do NOT take an anti-inflammatory medication such as Advil, Aleve, or Motrin for the first 8 weeks following your surgery. Anti-inflammatory medications like these hinder bone growth and healing, which is critical in the weeks following surgery. Do NOT resume taking Foasamax for 8 weeks after your fusion surgery. To help alleviate persistent soreness around the shoulder blades, apply ice or warm moist compresses. Driving  You may NOT drive a car until told otherwise by your physician. You may be a passenger for short distances (about 20-30 minutes). If you must take a longer trip, be sure to make several pit stops so that you can walk and stretch your legs. Reclining in the passenger seat seems to be the most comfortable position for most patients. In some states, it is illegal to drive a car while wearing a neck brace. Follow up appointments  When you are discharged from the hospital, a follow up appointment will be made for 2-3 weeks from your surgery date. Call [phone] to confirm your appointment. Medication Interaction:  During your procedure you potentially received a medication or medications which may reduce the effectiveness of oral contraceptives. Please consider other forms of contraception for 1 month following your procedure if you are currently using oral contraceptives as your primary form of birth control. In addition to this, we recommend continuing your oral contraceptive as prescribed, unless otherwise instructed by your physician, during this time.       After general anesthesia or intravenous sedation, for 24 hours or while taking prescription Narcotics:  · Limit your activities  · A responsible adult needs to be with you for the next 24 hours  · Do not drive and operate hazardous machinery  · Do not make important personal or business decisions  · Do not drink alcoholic beverages  · If you have not urinated within 8 hours after discharge, and you are experiencing discomfort from urinary retention, please go to the nearest Emergency Dept. · If you have sleep apnea and have a CPAP machine, please use it for all naps and sleeping. · Please use caution when taking narcotics and any of your home medications that may cause drowsiness. *  Please give a list of your current medications to your Primary Care Provider. *  Please update this list whenever your medications are discontinued, doses are      changed, or new medications (including over-the-counter products) are added. *  Please carry medication information at all times in case of emergency situations. These are general instructions for a healthy lifestyle:  No smoking/ No tobacco products/ Avoid exposure to second hand smoke  Surgeon General's Warning:  Quitting smoking now greatly reduces serious risk to your health. Obesity, smoking, and sedentary lifestyle greatly increases your risk for illness  A healthy diet, regular physical exercise & weight monitoring are important for maintaining a healthy lifestyle    You may be retaining fluid if you have a history of heart failure or if you experience any of the following symptoms:  Weight gain of 3 pounds or more overnight or 5 pounds in a week, increased swelling in our hands or feet or shortness of breath while lying flat in bed. Please call your doctor as soon as you notice any of these symptoms; do not wait until your next office visit.

## 2021-10-05 NOTE — OP NOTES
[address] [address]   [phone]    OPERATIVE REPORT    Patient ID:Wil Cintron  [id number]  1995  27 y.o. DATE OF SURGERY: 10/5/2021    SURGEON: Dr. Mi Hood DIAGNOSIS:right  L5 - S1 lateral recess stenosis and disc herniation. POSTOPERATIVE DIAGNOSIS: right L5 - S1 lateral recess stenosis and disc herniation. PROCEDURE:     1. right L5 hemilaminectomy including L5-S1 discectomy. (CPT [id number])      ANESTHESIA: General.    ESTIMATED BLOOD LOSS: 10 cc    POSTOPERATIVE CONDITION: Stable. INTRAOPERATIVE COMPLICATIONS: None. INDICATION FOR PROCEDURE: The patient has a history of low back pain with episodic radiation to the right lower extremity consistent with neurogenic claudication primarily affecting the S1 nerve root. The patient has failed an extended period of observation and conservative measures. In the outpatient setting, the risks, benefits, and potential complications of the procedure were discussed and an informed consent was obtained. DESCRIPTION OF PROCEDURE: After adequate induction of general anesthesia, the patient was positioned prone on the [facility] spinal table. Care was taken to pad all bony prominences. Shoulders and elbows were placed in a 90-90 position. The abdomen was allowed to hang free to decrease intraabdominal pressure. The legs were flexed using the sling. The lumbar area was prepped and draped in the usual sterile fashion using a DuraPrep scrub. Preoperative antibiotics were administered. A time-out was called to confirm appropriate patient, proposed procedure, and proposed incision site. With this confirmation, an incision was created over the appropriate interspace. Dissection was carried down to the lumbodorsal fascia. The lumbodorsal fascia was released on the right side and dissection was carried down to the lamina and pars interarticularis.  A 000 curette was used to elevate the ligamentum flavum at its origin on the caudal surface of the L5  lamina and a Penfield number four was slipped just anterior to the lamina. C-arm fluoroscopy was brought in and used to obtain a cross table fluoroscopic image to confirm appropriate spinal level. With this confirmation, the [place] number four was removed and the area was marked with electrocautery. The operating microscope was brought to the surgical field. The ligamentum flavum was then elevated off of its insertion on the cephalad surface of the  L5  lamina. A 4 mm Kerrison was used to perform a hemilaminectomy of L5. The ligamentum flavum was carefully elevated off of the thecal sac and excised with the Kerrison rongeur. The descending nerve root was identified and retracted medially. The lateral recess was undercut laterally to the pedicle. A foraminotomy was performed to decompress the exiting nerve root. The nerve root was retracted medially again with the Aleksandra nerve root retractor. The underlying disk was identified and inspected. A disc herniation was noted and a discectomy was also performed. The nerve was released from retraction and the area inspected and palpated with a nerve hook for adequacy of decompression. This was satisfactory. The lateral rescess was flushed with saline solution. The dorsal wound was flushed as well. The lumbodorsal fascia was approximated with a number one Vicryl suture in interrupted fashion. The skin and subcutaneous tissue were then closed in a layered fashion. Marcaine was infiltrated subcutaneously. Dermabond was applied. The patient tolerated the procedure well and was returned to the postanesthesia care unit in stable condition. At the end of the case, all sponge, needle, and instrument counts were correct. Signed: Tracy Malone.  Landen Infante MD

## 2021-10-05 NOTE — ANESTHESIA POSTPROCEDURE EVALUATION
Procedure(s):  RIGHT L5 HEMILAMINECTOMY W/ L5-S1 DISCECTOMY FLAT OSI WITH [facility]. general    Anesthesia Post Evaluation      Multimodal analgesia: multimodal analgesia used between 6 hours prior to anesthesia start to PACU discharge  Patient location during evaluation: bedside  Patient participation: complete - patient participated  Level of consciousness: awake and alert  Pain score: 2  Pain management: adequate  Airway patency: patent  Anesthetic complications: no  Cardiovascular status: acceptable  Respiratory status: acceptable  Hydration status: acceptable  Comments: Pt doing well. Ok to d/c home. Post anesthesia nausea and vomiting:  none  Final Post Anesthesia Temperature Assessment:  Normothermia (36.0-37.5 degrees C)      INITIAL Post-op Vital signs:   Vitals Value Taken Time   /76 10/05/21 1104   Temp 36.8 °C (98.3 °F) 10/05/21 1036   Pulse 79 10/05/21 1108   Resp 18 10/05/21 1050   SpO2 97 % 10/05/21 1108   Vitals shown include unvalidated device data.

## 2021-10-05 NOTE — ANESTHESIA PREPROCEDURE EVALUATION
Relevant Problems   No relevant active problems       Anesthetic History   No history of anesthetic complications            Review of Systems / Medical History  Patient summary reviewed and pertinent labs reviewed    Pulmonary                Comments: COVID 2/2020   Neuro/Psych   Within defined limits           Cardiovascular                  Exercise tolerance: >4 METS  Comments: Denies CP, SOB or changes in functional status   GI/Hepatic/Renal  Within defined limits              Endo/Other        Morbid obesity     Other Findings              Physical Exam    Airway  Mallampati: II  TM Distance: > 6 cm  Neck ROM: normal range of motion   Mouth opening: Normal     Cardiovascular    Rhythm: regular  Rate: normal         Dental      Comments: Perm retainer   Pulmonary  Breath sounds clear to auscultation               Abdominal  GI exam deferred       Other Findings            Anesthetic Plan    ASA: 3  Anesthesia type: general          Induction: Intravenous  Anesthetic plan and risks discussed with: Patient and Father

## 2021-10-05 NOTE — H&P
Name: Shira Valadez  YOB: 1995  Gender: male  MRN: [id number]  Age: 30 y. o.        Chief Complaint:  Radiating back and leg pain     History of Present Illness:       The patient returns today with persistent symptoms of lumbar radiculopathy resulting in a significant functional decline as previously described. The symptoms are worse with simple activities of daily living including walking and standing and there has been no lasting benefit from conservative efforts including tylenol, NSAIDS, spinal steroid injections and physical therapy  . At this point  he is ready to proceed with surgical intervention.      Pain Assessment   Location of Pain: Back  Location Modifiers: Right  Severity of Pain: 5  Quality of Pain: Dull  Duration of Pain: A few days  Frequency of Pain: Intermittent  Aggravating Factors: Walking (lifting)  Limiting Behavior: No  Relieving Factors: Rest  Result of Injury: No     Medications:        Current Outpatient Medications:     ibuprofen (MOTRIN) 600 mg tablet, TAKE 1 TABLET BY MOUTH THREE TIMES DAILY AS NEEDED FOR PAIN (TAKE WITH FOOD), Disp: , Rfl:      Allergies:  No Known Allergies        Physical Exam:      This is a well developed well nourished male adult.      Mood and affect are appropriate.     Oriented to person, place, and time.     Chest is clear to auscultation.     Heart is regular rate and rhythm.      The patient ambulates with an antalgic and crouched gait.      Sensory testing reveals diminished light touch sensation in the  right S1 dermatome including the posterior thigh, calf. .     Reflexes    Right Left   Quadriceps (L4) 2 2   Achilles (S1) 2 2      Ankle jerk is negative for clonus     Strength testing in the lower extremity reveals the following based on the 5 point grading scale:       HF (L2) H Ab (L5) KE (L3/4) ADF (L4) EHL (L5) A Ev (S1) APF (S1)   Right 5 5 5 5 5 4 4   Left 5 5 5 5 5 5 5         Radiographic Studies:      MRI of the lumbar spine images were reviewed and interpreted: He has a large right-sided L5-S1 disc herniation with severe right-sided stenosis and S1 contact     Diagnosis:         ICD-10-CM ICD-9-CM     1. Lumbar disc herniation  M51.26 722.10     2. Lumbar radiculopathy  M54.16 724. 4     3. Spinal stenosis of lumbar region with neurogenic claudication  M48.062 724.03           Assessment/Plan:       This patient's clinical history and physical exam is consistent with right S1 radiculopathy. The imaging studies are concordant with the patient's symptoms. Conservative efforts have been reasonably exhausted and the patient feels like he cannot go on with the symptoms as they are. We discussed the possibility of a hemilaminectomy. The imaging study shows multifactorial lateral recess stenosis and a hemilaminectomy is recommended to decompress the neurologic structures involved. We discussed the details of the surgery including a midline incision in over the low back followed by dissection to the area of stenosis. The nerves would be freed up by trimming any impinging structures including disc, ligaments and bone. Once the nerves are freed the wound would be closed with suture and covered with sterile dressings. The patient would expect to stay in recovery for observation or overnight in the hospital depending on how quickly they recover. Follow-up would be scheduled for 2-3 weeks and they would have restrictions including no driving, and no lifting greater than 15 lbs until follow up with me.   We also discussed the potential risks of the surgery including, but not limited to infection, spinal fluid leak and potential headaches requiring him to remain supine or have a lumbar drain inserted post-operatively; injury to the cauda equina or peripheral nerve root resulting in paralysis, bowel or bladder dysfunction, or loss of use of an extremity; persistent back or leg symptoms, recurrence of stenosis or the development of instability possibly needing additional surgery;  blood loss requiring transfusion; and the risks of anesthesia including, but not limited heart attack, stroke, and blood clot. The patient voiced an understanding of these issues and would like to discuss them over with  family and will get back with me with their desired treatment course. The surgery that I believe would be most beneficial here is a Right L5 hemilaminectomy with L5-S1 discectomy.  [facility] table with sling.             Electronically Signed By Dainel Jacobs MD

## 2022-03-18 PROBLEM — M51.26 LUMBAR DISC HERNIATION: Status: ACTIVE | Noted: 2021-10-05

## 2022-03-19 PROBLEM — M54.16 LUMBAR RADICULOPATHY: Status: ACTIVE | Noted: 2021-10-05

## 2023-03-01 ENCOUNTER — OFFICE VISIT (OUTPATIENT)
Dept: FAMILY MEDICINE CLINIC | Facility: CLINIC | Age: 28
End: 2023-03-01
Payer: COMMERCIAL

## 2023-03-01 ENCOUNTER — NURSE TRIAGE (OUTPATIENT)
Dept: OTHER | Facility: CLINIC | Age: 28
End: 2023-03-01

## 2023-03-01 VITALS
SYSTOLIC BLOOD PRESSURE: 128 MMHG | HEIGHT: 77 IN | HEART RATE: 101 BPM | WEIGHT: 315 LBS | DIASTOLIC BLOOD PRESSURE: 68 MMHG | OXYGEN SATURATION: 98 % | TEMPERATURE: 98.1 F | BODY MASS INDEX: 37.19 KG/M2

## 2023-03-01 DIAGNOSIS — M25.562 ACUTE PAIN OF LEFT KNEE: Primary | ICD-10-CM

## 2023-03-01 PROCEDURE — 99205 OFFICE O/P NEW HI 60 MIN: CPT | Performed by: FAMILY MEDICINE

## 2023-03-01 SDOH — ECONOMIC STABILITY: FOOD INSECURITY: WITHIN THE PAST 12 MONTHS, YOU WORRIED THAT YOUR FOOD WOULD RUN OUT BEFORE YOU GOT MONEY TO BUY MORE.: NEVER TRUE

## 2023-03-01 SDOH — ECONOMIC STABILITY: INCOME INSECURITY: HOW HARD IS IT FOR YOU TO PAY FOR THE VERY BASICS LIKE FOOD, HOUSING, MEDICAL CARE, AND HEATING?: SOMEWHAT HARD

## 2023-03-01 SDOH — ECONOMIC STABILITY: FOOD INSECURITY: WITHIN THE PAST 12 MONTHS, THE FOOD YOU BOUGHT JUST DIDN'T LAST AND YOU DIDN'T HAVE MONEY TO GET MORE.: NEVER TRUE

## 2023-03-01 SDOH — ECONOMIC STABILITY: HOUSING INSECURITY
IN THE LAST 12 MONTHS, WAS THERE A TIME WHEN YOU DID NOT HAVE A STEADY PLACE TO SLEEP OR SLEPT IN A SHELTER (INCLUDING NOW)?: NO

## 2023-03-01 ASSESSMENT — ENCOUNTER SYMPTOMS
COUGH: 0
SHORTNESS OF BREATH: 0

## 2023-03-01 ASSESSMENT — PATIENT HEALTH QUESTIONNAIRE - PHQ9
2. FEELING DOWN, DEPRESSED OR HOPELESS: 0
SUM OF ALL RESPONSES TO PHQ9 QUESTIONS 1 & 2: 0
SUM OF ALL RESPONSES TO PHQ QUESTIONS 1-9: 0
1. LITTLE INTEREST OR PLEASURE IN DOING THINGS: 0
SUM OF ALL RESPONSES TO PHQ QUESTIONS 1-9: 0

## 2023-03-01 NOTE — TELEPHONE ENCOUNTER
Location of patient: [place]    Received call from RAFY Araiza at [facility] with Red Flag Complaint. Subjective: Caller states \"I saw an urgent care and I sprained my ankle and knee but they didn't help me. They gave me a Prednisone pack and it hasn't helped. \"     Current Symptoms: injury to left ankle and left knee due to a fall down stairs. Onset: 2/23/2023   unchanged    Associated Symptoms: swelling to top of foot improved. Knee and ankle swelling unchanged. No bruising noted. Pain worse with stairs or incline. Pain Severity: 7/10; knee sofia deep stabbing sharp and gives out. 4/10 ankle burning sensation. ; constant    Temperature: denies     What has been tried: Prednisone per urgent care. Compression, ice, Tylenol, Ibuprofen    LMP: NA Pregnant: NA    Recommended disposition: See in Office Today or Tomorrow    Care advice provided, patient verbalizes understanding; denies any other questions or concerns; instructed to call back for any new or worsening symptoms. Patient/Caller agrees with recommended disposition; writer provided warm transfer to Gianni Jackson at [facility] for appointment scheduling    Attention Provider: Thank you for allowing me to participate in the care of your patient. The patient was connected to triage in response to information provided to the ECC/PSC. Please do not respond through this encounter as the response is not directed to a shared pool.         Reason for Disposition   Injury interferes with work or school    Protocols used: Knee Injury-ADULT-[place]

## 2023-03-01 NOTE — PROGRESS NOTES
Mary Keller (: 1995) is a 32 y.o. male, new patient, here for evaluation of the following chief complaint(s):  Follow-up (Left knee and ankle pain/given prednisone )       ASSESSMENT/PLAN:  1. Acute pain of left knee  -     [address]    Discussed getting x-ray versus sending straight to orthopedics. We will send referral to Ortho to be evaluated right away. This is affecting his ability to work since he works as a . He did have a urgent care fill out some [facility] paperwork for a few days, but he is unable to do any duties at this time. We will fill out paperwork today, have him be evaluated. Advised to continue anti-inflammatories. Return if symptoms worsen or fail to improve. SUBJECTIVE/OBJECTIVE:  HPI  78-year-old male with a history of low back pain, prior lumbar discectomy who presents to establish care. He says he lost 40 pounds in the last year despite eating healthy. He has not had any recent blood work. He works as a  for [employer]. He said on  he fell on the stairs and rolled his left ankle. He was seen at the urgent care, had x-rays done. Was given a boot, prednisone. About a week after that his left knee started hurting as well. He said overall his ankle has done better, the swelling is gone down the bruising has resolved. But his left knee is causing a lot of pain, lot of swelling, leg giving out. He states he takes Tylenol ibuprofen which helps only slightly. PMH   has a past medical history of History of COVID-19.     Past Surgical History:   Procedure Laterality Date    ADENOIDECTOMY      LUMBAR DISCECTOMY      2019    TOE SURGERY      2017    WISDOM TOOTH EXTRACTION         Current Outpatient Medications on File Prior to Visit   Medication Sig Dispense Refill    clindamycin (CLEOCIN T) 1 % lotion       acetaminophen (TYLENOL) 500 MG tablet Take 1,000 mg by mouth every 6 hours as needed      ibuprofen (ADVIL;MOTRIN) 200 MG tablet Take 200 mg by mouth every 6 hours as needed       No current facility-administered medications on file prior to visit. Social History     Socioeconomic History    Marital status: Single     Spouse name: Not on file    Number of children: Not on file    Years of education: Not on file    Highest education level: Not on file   Occupational History    Not on file   Tobacco Use    Smoking status: Never    Smokeless tobacco: Never   Substance and Sexual Activity    Alcohol use: Yes    Drug use: Not Currently    Sexual activity: Not on file   Other Topics Concern    Not on file   Social History Narrative    Not on file     Social Determinants of Health     Financial Resource Strain: Medium Risk    Difficulty of Paying Living Expenses: Somewhat hard   Food Insecurity: No Food Insecurity    Worried About Running Out of Food in the Last Year: Never true    Ran Out of Food in the Last Year: Never true   Transportation Needs: Unknown    Lack of Transportation (Medical): Not on file    Lack of Transportation (Non-Medical): No   Physical Activity: Not on file   Stress: Not on file   Social Connections: Not on file   Intimate Partner Violence: Not on file   Housing Stability: Unknown    Unable to Pay for Housing in the Last Year: Not on file    Number of Places Lived in the Last Year: Not on file    Unstable Housing in the Last Year: No       Family History   Problem Relation Age of Onset    No Known Problems Father     No Known Problems Mother          ALLERGIES  No Known Allergies    PREVIOUS PRIMARY CARE PROVIDER  Livan Null MD      RECORDS    Provided by patient:      Review of Systems   Constitutional:  Negative for activity change, appetite change, fever and unexpected weight change. Respiratory:  Negative for cough and shortness of breath. Cardiovascular:  Negative for chest pain and palpitations. Skin:  Negative for rash. Neurological:  Negative for dizziness and headaches. Psychiatric/Behavioral:  Negative for sleep disturbance. Physical Exam  Vitals reviewed. Constitutional:       General: He is not in acute distress. Appearance: Normal appearance. He is not ill-appearing or toxic-appearing. HENT:      Head: Normocephalic and atraumatic. Eyes:      Conjunctiva/sclera: Conjunctivae normal.   Cardiovascular:      Rate and Rhythm: Normal rate and regular rhythm. Heart sounds: Normal heart sounds. No murmur heard. No friction rub. No gallop. Pulmonary:      Effort: Pulmonary effort is normal. No respiratory distress. Breath sounds: Normal breath sounds. No wheezing, rhonchi or rales. Musculoskeletal:         General: No swelling. Normal range of motion. Left knee: Swelling present. Normal range of motion. Tenderness present. No LCL laxity, MCL laxity, ACL laxity or PCL laxity. Skin:     General: Skin is warm. Findings: No rash. Neurological:      Mental Status: He is alert. Mental status is at baseline. Psychiatric:         Mood and Affect: Mood normal.       Vitals:    03/01/23 0959   BP: 128/68   Pulse: (!) 101   Temp: 98.1 °F (36.7 °C)   SpO2: 98%        On this date, I spent 60 minutes reviewing previous notes, test results and face to face with the patient discussing the diagnosis and importance of compliance with the treatment plan as well as documenting on the day of the visit. No LOS data to display        An electronic signature was used to authenticate this note.   -- Thaddeus Brown MD

## 2023-03-02 ENCOUNTER — OFFICE VISIT (OUTPATIENT)
Dept: ORTHOPEDIC SURGERY | Age: 28
End: 2023-03-02

## 2023-03-02 DIAGNOSIS — M25.562 ACUTE PAIN OF LEFT KNEE: ICD-10-CM

## 2023-03-02 DIAGNOSIS — S76.112A RUPTURE OF LEFT QUADRICEPS TENDON, INITIAL ENCOUNTER: Primary | ICD-10-CM

## 2023-03-02 NOTE — PROGRESS NOTES
Name: Marlene Fernandez  YOB: 1995  Gender: male  MRN: [id number]  Date of Encounter:  3/2/2023       CHIEF COMPLAINT:     Chief Complaint   Patient presents with    Knee Pain     Left        SUBJECTIVE/OBJECTIVE:      HPI:    Patient is a 32 y.o. pleasant male who presents today for a new evaluation of his left knee pain. He fell from some stairs around 8 days ago, and injured his ankle. His ankle has been swollen and he has been wearing a brace. That is improved with a prednisone pack given to him by his family practice doctor. 2 days after the fall he started to note burning and pain in the knee. He had swelling as well. He has not been able to extend his knee without extreme difficulty. He has to assist his leg motion with his arms or the other leg. He feels unstable in the knee. He has a history of a partial PCL tear on this leg, but did not perform any surgeries. PAST HISTORY:   Past medical, surgical, family, social history and allergies reviewed by me. Pertinent history: Lumbar discectomy, PCL partial tear  Tobacco use:  reports that he has never smoked. He has never used smokeless tobacco.  Diabetes: none  Anticoagulation: no      REVIEW OF SYSTEMS:   As noted in HPI. PHYSICAL EXAMINATION:     Gen: Well-developed, no acute distress   HEENT: NC/AT, EOMI   Neck: Trachea midline, normal ROM   CV: Regular rhythm by palpation of distal pulse, normal capillary refill   Pulm: No respiratory distress, no stridor   Psychiatric: Well oriented, normal mood and affect. Skin: No rashes, lesions or ulcers, normal temperature, turgor, and texture on uninvolved extremity. ORTHO EXAM:    Left Knee Exam:     Fullness superior and warmth to patella. No erythema. No knee effusion. Minimal extension of leg. Tenderness to distal quadricep and proximal patellar tendon. No tenderness of joint line. Negative anterior posterior drawer, Shelbi.   Normal capillary refill   [facility]:     X-ray LEFT knee 4 vw AP / lateral / Skier / sunrise for knee pain    Findings: Superior and inferior patellar enthesophytes. Quadriceps tendon is visible, appears to insert to the patella and there is normal patellar position, but there is edema present. No significant effusion. No fracture is seen. Otherwise normal alignment of the knee. No masses. No significant degenerative changes. Impression: Superior and inferior patellar enthesophytes, with distal quadriceps swelling. Otherwise normal x-ray    I independently interpreted XR taken today    A brief ultrasound of the left knee reveals quadriceps and patellar tendon fibers inserting and originating from the patellar tendon to the tibial tuberosity, but with some irregularity of the tendon. There is some mild fluid and possible hyperdensity in the patellar tendon. The quadriceps tendon has some edema as well. ASSESSMENT/PLAN:   1. Rupture of left quadriceps tendon, initial encounter    2. Acute pain of left knee         I am concerned about a partial tendon injury and have recommended an MRI of the left knee. He has a brace, can weight-bear as tolerated, but instructed to avoid significant exertional activity, running, jumping at this time. Continue oral NSAIDs as needed for pain, icing for swelling. We will obtain MRI of the left knee. Orders / medications today:   Orders Placed This Encounter    XR KNEE LEFT (MIN 4 VIEWS)     Left knee AP, lateral, skiers & tangential     Standing Status:   Future     Number of Occurrences:   1     Standing Expiration Date:   3/2/2024    MRI KNEE LEFT WO CONTRAST     Standing Status:   Future     Standing Expiration Date:   3/2/2024     Order Specific Question:   Reason for exam:     Answer:   Partial tendon rupture     Order Specific Question:   What is the sedation requirement? Answer:   None      Follow up: Return for MRI results.          The patient expressed understanding and agreed with the plan. Juan Pal MD   [facility]     This document was created using voice recognition software so frequent mistakes are possible. For any concerns about the wording of this document, please contact its creator for further clarification.

## 2023-03-08 ENCOUNTER — PATIENT MESSAGE (OUTPATIENT)
Dept: FAMILY MEDICINE CLINIC | Facility: CLINIC | Age: 28
End: 2023-03-08

## 2023-03-08 ENCOUNTER — TELEPHONE (OUTPATIENT)
Dept: FAMILY MEDICINE CLINIC | Facility: CLINIC | Age: 28
End: 2023-03-08

## 2023-03-08 NOTE — TELEPHONE ENCOUNTER
From: Daryl Rivera  To: Dr. Karla Covarrubias: 3/8/2023 1:41 PM EST  Subject: Lavonne Clark, I was just wanting get a update about the [facility] paperwork. They have me scheduled for a MRI on the 14th and I was just wanting to know if that is when they can tell me how long I'll be out.    Thanks, Cristopher Henderson

## 2023-03-15 ENCOUNTER — OFFICE VISIT (OUTPATIENT)
Dept: ORTHOPEDIC SURGERY | Age: 28
End: 2023-03-15
Payer: COMMERCIAL

## 2023-03-15 DIAGNOSIS — S76.112A QUADRICEPS STRAIN, LEFT, INITIAL ENCOUNTER: Primary | ICD-10-CM

## 2023-03-15 PROCEDURE — 99213 OFFICE O/P EST LOW 20 MIN: CPT | Performed by: STUDENT IN AN ORGANIZED HEALTH CARE EDUCATION/TRAINING PROGRAM

## 2023-03-15 NOTE — PROGRESS NOTES
Name: Moody Oneal  YOB: 1995  Gender: male  MRN: [id number]      CC: Follow-up (Left knee - MRI Results)       HPI: Moody Oneal is a 32 y.o. male who returns for follow up and MRI results of the left knee. His knee pain is much improved and he can extend his leg without difficulty. He has noticed some quad pain to the right knee now. Ankle pain also improved. Physical Examination:  General: no acute distress, well appearing  Lungs: no respiratory distress or stridor   CV: regular rhythm by pulse, normal capillary refill    L knee:     Extension of knee full, with resisted extension 4+/5. No notable swelling. Minimal tenderness superior to patella. Normal gait. Imaging:     I independently interpreted the MRI I ordered of the left knee    Findings:     Left quadricep fibers are intact, but edema present in the quad tendon and mild insertional osseous edema at patella. Patellar tendon is intact. Patellar position appears normal. Mild prepatellar bursal distension. Meniscus intact. PCL, ACL, MCL, LCL intact. Meniscus medial and lateral intact. Chondral surfaces appear normal.     Assessment:   1. Quadriceps strain, left, initial encounter        Plan:     His MRI is somewhat concerning for edema in the quad tendon fibers, but the tendon does appear intact. However, his exam is much improved from last encounter. He has full extension of the leg. I would therefore advised nonoperative treatment. Did advise we begin therapy for the leg given he has reported weakness of the leg and now pain to the right knee. Therapy referral was ordered. NSAIDs recommended for pain. Discussed weight loss for knee pain. Patient advised to follow up in 2 months for recheck. Return in about 2 months (around 5/15/2023).      Magdalena Martinez MD   [facility]

## 2023-03-27 ENCOUNTER — TELEPHONE (OUTPATIENT)
Dept: FAMILY MEDICINE CLINIC | Facility: CLINIC | Age: 28
End: 2023-03-27

## 2023-03-27 NOTE — TELEPHONE ENCOUNTER
Regarding: [facility]  ----- Message from Becky Phelan MD sent at 3/27/2023 11:55 AM EDT -----       ----- Message from Tyron Johnson to Becky Phelan MD sent at 3/27/2023  9:10 AM -----   Skye Raya I was wondering if we could change the date on the [facility] paperwork from the 18th of this month to the 29th. The reason is that we were out for spring break and they want me to cover those days. Sorry for any inconveniences  Thanks, Prabha MCKEON      ----- Message -----       From:Moe Hoffman       Sent:3/8/2023  1:41 PM EST         To:Dr. Renata Delgadillo, I was just wanting get a update about the [facility] paperwork. They have me scheduled for a MRI on the 14th and I was just wanting to know if that is when they can tell me how long I'll be out.    Thanks, Prabha Patrick

## 2023-03-30 ENCOUNTER — TELEPHONE (OUTPATIENT)
Dept: FAMILY MEDICINE CLINIC | Facility: CLINIC | Age: 28
End: 2023-03-30

## 2023-03-31 ENCOUNTER — TELEPHONE (OUTPATIENT)
Dept: FAMILY MEDICINE CLINIC | Facility: CLINIC | Age: 28
End: 2023-03-31

## 2023-03-31 NOTE — TELEPHONE ENCOUNTER
Pt called back and verbalized the dates 3/18-4/1    Pt verbalized he needs a work release      [employer]

## 2023-03-31 NOTE — TELEPHONE ENCOUNTER
Regarding: [employer]  ----- Message from Becky Phelan MD sent at 3/29/2023  1:16 PM EDT -----       ----- Message from BOBBY to Becky Phelan MD sent at 3/29/2023 11:38 AM -----   Sorry to bother you again but can you send me a work release stating Im good to go back to work       ----- Message -----       From:Moe Hoffman       Sent:3/27/2023  2:46 PM EDT         To:Patient Medical Advice Request Message List    Subject:Fmla    I contacted my employer and the leave admin told me to just have my doctor fill out a doctor's note with the amendmented dates (instead of 3/18 it needs to be 3/29) and fax it to [phone]  Thanks again Prabha desai      ----- Message -----       From:Moe Hoffman       Sent:3/27/2023  9:10 AM EDT         To:Patient Medical Advice Request Message List    Subject:Fmla    Dariana I was wondering if we could change the date on the [place] paperwork from the 18th of this month to the 29th. The reason is that we were out for spring break and they want me to cover those days. Sorry for any inconveniences  ThanksPrabha      ----- Message -----       From:Moe Hoffman       Sent:3/8/2023  1:41 PM EST         To:Dr. Renata Delgadillo, I was just wanting get a update about the [facility] paperwork. They have me scheduled for a MRI on the 14th and I was just wanting to know if that is when they can tell me how long I'll be out.    Thanks, Prabha Patrick

## 2023-03-31 NOTE — TELEPHONE ENCOUNTER
Regarding: [employer]  ----- Message from Aylin Garcia MD sent at 3/29/2023  1:16 PM EDT -----       ----- Message from BOBBY to Aylin Garcia MD sent at 3/29/2023 11:38 AM -----   Sorry to bother you again but can you send me a work release stating Im good to go back to work       ----- Message -----       From:Moe Sinan Good       Sent:3/27/2023  2:46 PM EDT         To:Patient Medical Advice Request Message List    Subject:Fmla    I contacted my employer and the leave admin told me to just have my doctor fill out a doctor's note with the amendmented dates (instead of 3/18 it needs to be 3/29) and fax it to [phone]  Thanks again Brady desai      ----- Message -----       From:Moe Reederky Florentino       Sent:3/27/2023  9:10 AM EDT         To:Patient Medical Advice Request Message List    Subject:Fmla    Dariana I was wondering if we could change the date on the [facility] paperwork from the 18th of this month to the 29th. The reason is that we were out for spring break and they want me to cover those days. Sorry for any inconveniences  ThanksBrady      ----- Message -----       From:Moe Good       Sent:3/8/2023  1:41 PM EST         To:Dr. Gal Solano, I was just wanting get a update about the [facility] paperwork. They have me scheduled for a MRI on the 14th and I was just wanting to know if that is when they can tell me how long I'll be out.    Thanks, Brady Ragsdale

## 2023-03-31 NOTE — TELEPHONE ENCOUNTER
Called pt to inform him that his return to work slip is ready. Return to work slip has been emailed to pt email address [email or web address]    Leticia Guerra

## 2023-04-04 ENCOUNTER — TELEPHONE (OUTPATIENT)
Dept: ORTHOPEDIC SURGERY | Age: 28
End: 2023-04-04

## 2023-04-04 ENCOUNTER — HOSPITAL ENCOUNTER (OUTPATIENT)
Dept: PHYSICAL THERAPY | Age: 28
Setting detail: RECURRING SERIES
Discharge: HOME | End: 2023-04-07
Payer: COMMERCIAL

## 2023-04-04 PROCEDURE — 97035 APP MDLTY 1+ULTRASOUND EA 15: CPT

## 2023-04-04 PROCEDURE — 97110 THERAPEUTIC EXERCISES: CPT

## 2023-04-04 PROCEDURE — 97162 PT EVAL MOD COMPLEX 30 MIN: CPT

## 2023-04-04 NOTE — LETTER
April 4, 2023       Vanessalia January YOB: 1995   [address] Date of Visit:  4/4/2023       To Whom It May Concern: It is my medical opinion that Camelia January {Work release (duty restriction):74956}. If you have any questions or concerns, please don't hesitate to call.     Sincerely,        Dru Lennox, MD

## 2023-04-04 NOTE — THERAPY EVALUATION
moderate     PLAN   Effective Dates: 4/4/23 TO   5/30/23  Frequency/Duration:   2/week for 8 weeks   Interventions Planned (Treatment may consist of any combination of the following): Therapeutic exercise, therapeutic activity, NRE, manual therapy, and vasopneumatic modalities. Goals: (Goals have been discussed and agreed upon with patient.)  Short-Term Functional Goals: Time Frame: 2 weeks  Pt will confirm compliance with home program to continue improving L quad strength. Discharge Goals: Time Frame: 8 weeks  Pt will be able to navigate stairs with reciprocal gait with no limitations to be able to be mobile in community. Pt will be able to carry 50 lb without loss of balance to be able to return to work. Pt will be able to balance for 20 seconds in SLS to reduce risk of falling. Outcome Measure: Tool Used: Lower Extremity Functional Scale (LEFS)  Score:  Initial: 61/80 Most Recent: X/80 (Date: -- )   Interpretation of Score: 20 questions each scored on a 5 point scale with 0 representing \"extreme difficulty or unable to perform\" and 4 representing \"no difficulty\". The lower the score, the greater the functional disability. 80/80 represents no disability. Minimal detectable change is 9 points. Medical Necessity:   > Patient is expected to demonstrate progress in strength, range of motion, and balance to return to PLOF and work. Reason For Services/Other Comments:  > Patient continues to require skilled intervention due to impairments of L knee strength and ROM. Total Duration:   1586-6163    Regarding Thais Campbell's therapy, I certify that the treatment plan above will be carried out by a therapist or under their direction.   Thank you for this referral,  Guillaume Mejia, PT     Referring Physician Signature: Arely Freitas MD _______________________________ Date _____________        Post Session Pain  Charge Capture  PT Visit Info MD Guidelines  MyChart

## 2023-04-04 NOTE — TELEPHONE ENCOUNTER
Called patient he said PT said he is not at 100% for work.  He would like a work note stating no ladders, stairs etc     Please advise

## 2023-04-04 NOTE — PROGRESS NOTES
patient's restricted joint motion, painful spasm, loss of articular motion and restricted motion of soft tissue. MODALITIES: (see below for minutes): to decrease pain    Date: 4/4/23       Modalities: 15 min       Gameready 15' L knee, medium compression, coldest setting                       Therapeutic Exercise: 15 min        SLR: x10 B  LAQ: x10 L  Bridge: x8  Quad set: x10 s  Hip abduction: x10 L  SLS: x2 B        Proprioceptive Activities:                                Manual Therapy:                        Functional Activities:                                        Home program: SLR, LAQ, heel slides    Treatment/Session Summary:    >Treatment Assessment:   Pt tolerated exercises well and reported mild discomfort that went away after end of set. Communication/Consultation:  None today  Equipment provided today:  None  Recommendations/Intent for next treatment session: Next visit will focus on improving L quad strength.     >Total Treatment Billable Duration:  45 minutes  Time In: 0845  Time Out: 0945    Shania Bergman, PT, DPT    Charge Capture  }Post Session Pain  PT Visit Info  [facility] [employer] [facility]  MD Guidelines  Scanned Media  Benefits  MyChart    Future Appointments   Date Time Provider Quan Goyal   4/6/2023  8:45 AM Shania Bergman, PT [facility]   4/11/2023  8:45 AM Shania Bergman, PT [facility]   4/13/2023  8:45 AM Shania Bergman, PT [facility]   4/18/2023  8:45 AM Shania Bergman, PT [facility]   4/20/2023  8:45 AM Shania Bergman, PT [facility]   4/25/2023  8:45 AM Shania Bergman, PT [facility]   4/27/2023  8:45 AM Shania Bergman, PT [facility]

## 2023-04-13 ENCOUNTER — HOSPITAL ENCOUNTER (OUTPATIENT)
Dept: PHYSICAL THERAPY | Age: 28
Setting detail: RECURRING SERIES
Discharge: HOME | End: 2023-04-16
Payer: COMMERCIAL

## 2023-04-13 PROCEDURE — 97110 THERAPEUTIC EXERCISES: CPT

## 2023-04-13 PROCEDURE — 97016 VASOPNEUMATIC DEVICE THERAPY: CPT

## 2023-04-18 ENCOUNTER — HOSPITAL ENCOUNTER (OUTPATIENT)
Dept: PHYSICAL THERAPY | Age: 28
Setting detail: RECURRING SERIES
Discharge: HOME | End: 2023-04-21
Payer: COMMERCIAL

## 2023-04-18 PROCEDURE — 97110 THERAPEUTIC EXERCISES: CPT

## 2023-04-18 PROCEDURE — 97016 VASOPNEUMATIC DEVICE THERAPY: CPT

## 2023-04-18 NOTE — PROGRESS NOTES
Progressed resistance, range, repetitions and complexity of movement as indicated. MANUAL THERAPY: (see below for minutes): Joint mobilization and Soft tissue mobilization was utilized and necessary because of the patient's restricted joint motion, painful spasm, loss of articular motion and restricted motion of soft tissue. MODALITIES: (see below for minutes): to decrease pain    Date: 4/4/23 4/11/23 (visit 2) 4/13/23 (visit 3) 4/18/23 (visit 4)     Modalities: 15 min 15 min 15 min 15 min    Gameready, coldest setting, medium compression 15' L knee, medium compression, coldest setting 15' B knees, medium compression 15' L knee 15' L knee                    Therapeutic Exercise: 15 min 45 min 40 min 40 min     SLR: x10 B  LAQ: x10 L  Bridge: x8  Quad set: x10 s  Hip abduction: x10 L  SLS: x2 B  LAQ: 2x10 L  STS with emphasis on 50/50 weight: x10 high height, x5 medium height  Marching: 2x10   Walking in clinc: 2x50' with cues for equal weight   SLR: 3x10 B  Bridge: 3x10   Pt educated on more equal weight bearing to offload his R leg LAQ: 2x20 L  STS: 2x10 medium height   SLR: 3x10 L  Bridge: 3x10  Foam step up with SLS balance: 2x10 B  Knee hugs: x3 B supine  LAQ: 2x20 L  STS: 2x10 low height   SLR: 2x15 L  Bridge: 2x10  Foam step up with SLS balance: 2x10 B  Eccentric step down: 2x10 B on 4\" step. Pt found this difficult on L LE      Proprioceptive Activities:                                Manual Therapy:                        Functional Activities:                                        Home program: SLR, LAQ, heel slides    Treatment/Session Summary:    >Treatment Assessment:   Pt advanced to eccentric step downs today. This was challenging and fatiguing on L leg, but no painful. He will continue to be advanced as he gains strength with goal of completing 6 inch eccentric step down using L lower extremity with no hands for balance.    Communication/Consultation:  None today  Equipment provided today:

## 2023-04-20 ENCOUNTER — HOSPITAL ENCOUNTER (OUTPATIENT)
Dept: PHYSICAL THERAPY | Age: 28
Setting detail: RECURRING SERIES
Discharge: HOME | End: 2023-04-23
Payer: COMMERCIAL

## 2023-04-20 PROCEDURE — 97016 VASOPNEUMATIC DEVICE THERAPY: CPT

## 2023-04-20 PROCEDURE — 97110 THERAPEUTIC EXERCISES: CPT

## 2023-04-21 ENCOUNTER — TELEPHONE (OUTPATIENT)
Dept: ORTHOPEDIC SURGERY | Age: 28
End: 2023-04-21

## 2023-04-25 ENCOUNTER — HOSPITAL ENCOUNTER (OUTPATIENT)
Dept: PHYSICAL THERAPY | Age: 28
Setting detail: RECURRING SERIES
Discharge: HOME | End: 2023-04-28
Payer: COMMERCIAL

## 2023-04-25 PROCEDURE — 97016 VASOPNEUMATIC DEVICE THERAPY: CPT

## 2023-04-25 PROCEDURE — 97110 THERAPEUTIC EXERCISES: CPT

## 2023-04-27 ENCOUNTER — HOSPITAL ENCOUNTER (OUTPATIENT)
Dept: PHYSICAL THERAPY | Age: 28
Setting detail: RECURRING SERIES
Discharge: HOME | End: 2023-04-30
Payer: COMMERCIAL

## 2023-04-27 PROCEDURE — 97016 VASOPNEUMATIC DEVICE THERAPY: CPT

## 2023-04-27 PROCEDURE — 97110 THERAPEUTIC EXERCISES: CPT

## 2023-05-02 ENCOUNTER — HOSPITAL ENCOUNTER (OUTPATIENT)
Dept: PHYSICAL THERAPY | Age: 28
Setting detail: RECURRING SERIES
Discharge: HOME | End: 2023-05-05
Payer: COMMERCIAL

## 2023-05-02 PROCEDURE — 97110 THERAPEUTIC EXERCISES: CPT

## 2023-05-02 NOTE — PROGRESS NOTES
Brittani Calles  : 1995  Primary: Monique Lamar Sc ([employer]  Secondary:  [address] @ [address] [place] [id number]  Phone: [phone]  Fax: [phone] No data recorded  No data recorded    >PT Visit Info:       Visit Count:  8    OUTPATIENT PHYSICAL THERAPY:OP NOTE TYPE: Treatment Note 2023       Episode  }Appt Desk             Treatment Diagnosis:  Pain in Left Knee (M25.562)  Stiffness of Left Knee, Not elsewhere classified (M25.662)  Difficulty in walking, Not elsewhere classified (R26.2)  Medical/Referring Diagnosis:  Quadriceps strain, left, initial encounter [X94.375M]  Referring Physician:  Sarah Wagner MD MD Orders:  PT Eval and Treat   Date of Onset:  2023  Allergies:   Patient has no known allergies. Restrictions/Precautions: none    Interventions Planned (Treatment may consist of any combination of the following): Therapeutic exercises, therapeutic activities, NRE, manual therapy, modalities   >Subjective Comments: Pt reports his knees ache, but aren't really painful. He is due to return to work today. >Initial:      0.5/10 L knee >Post Session: 0/10  Medications Last Reviewed:  2023  Updated Objective Findings:  See evaluation note from today  Treatment   THERAPEUTIC ACTIVITY: ( see below for minutes): Therapeutic activities per grid below to improve mobility, strength, balance and coordination. Required minimal visual, verbal, manual and tactile cues to improve independence and safety with daily activities . THERAPEUTIC EXERCISE: (see below for minutes): Exercises per grid below to improve mobility, strength, balance and coordination. Required minimal verbal and manual cues to promote proper body alignment, promote proper body posture and promote proper body mechanics. Progressed resistance, range, repetitions and complexity of movement as indicated.   MANUAL THERAPY: (see below for minutes): Joint mobilization and Soft tissue

## 2023-05-03 ENCOUNTER — TELEPHONE (OUTPATIENT)
Dept: ORTHOPEDIC SURGERY | Age: 28
End: 2023-05-03

## 2023-05-04 ENCOUNTER — HOSPITAL ENCOUNTER (OUTPATIENT)
Dept: PHYSICAL THERAPY | Age: 28
Setting detail: RECURRING SERIES
Discharge: HOME | End: 2023-05-07
Payer: COMMERCIAL

## 2023-05-04 PROCEDURE — 97016 VASOPNEUMATIC DEVICE THERAPY: CPT

## 2023-05-04 PROCEDURE — 97110 THERAPEUTIC EXERCISES: CPT

## 2023-05-09 ENCOUNTER — HOSPITAL ENCOUNTER (OUTPATIENT)
Dept: PHYSICAL THERAPY | Age: 28
Setting detail: RECURRING SERIES
Discharge: HOME | End: 2023-05-12
Payer: COMMERCIAL

## 2023-05-09 PROCEDURE — 97016 VASOPNEUMATIC DEVICE THERAPY: CPT

## 2023-05-09 PROCEDURE — 97110 THERAPEUTIC EXERCISES: CPT

## 2023-05-09 NOTE — PROGRESS NOTES
Esther Cruz  : 1995  Primary: Kenia Aranajuventino Barrientos ([employer])  Secondary:  [address] @ [address] [place] [address]  Phone: [phone]  Fax: [phone] No data recorded  No data recorded    >PT Visit Info:       Visit Count:  10    OUTPATIENT PHYSICAL THERAPY:OP NOTE TYPE: Treatment Note 2023       Episode  }Appt Desk             Treatment Diagnosis:  Pain in Left Knee (M25.562)  Stiffness of Left Knee, Not elsewhere classified (M25.662)  Difficulty in walking, Not elsewhere classified (R26.2)  Medical/Referring Diagnosis:  Quadriceps strain, left, initial encounter [I26.979G]  Referring Physician:  Reinaldo Coyle MD MD Orders:  PT Eval and Treat   Date of Onset:  2023  Allergies:   Patient has no known allergies. Restrictions/Precautions: none    Interventions Planned (Treatment may consist of any combination of the following): Therapeutic exercises, therapeutic activities, NRE, manual therapy, modalities   >Subjective Comments: Pt reports his R ankle started swelling a few days ago and has pain in the posterior and lateral aspects. He is wearing his brace again. He denies any known incident to injury it. His reports his left knee has been doing well. >Initial:      0/10 L knee >Post Session: 0/10  Medications Last Reviewed:  2023  Updated Objective Findings:  See evaluation    Goals: (Goals have been discussed and agreed upon with patient.)  Short-Term Functional Goals: Time Frame: 2 weeks  Pt will confirm compliance with home program to continue improving L quad strength. MET  Discharge Goals: Time Frame: 8 weeks  Pt will be able to navigate stairs with reciprocal gait with no limitations to be able to be mobile in community. PROGRESSING  Pt will be able to carry 50 lb without loss of balance to be able to return to work. PROGRESSING  Pt will be able to balance for 20 seconds in SLS to reduce risk of falling.  PROGRESSING  Treatment

## 2023-05-11 ENCOUNTER — HOSPITAL ENCOUNTER (OUTPATIENT)
Dept: PHYSICAL THERAPY | Age: 28
Setting detail: RECURRING SERIES
Discharge: HOME | End: 2023-05-14
Payer: COMMERCIAL

## 2023-05-11 PROCEDURE — 97110 THERAPEUTIC EXERCISES: CPT

## 2023-05-15 ENCOUNTER — PATIENT MESSAGE (OUTPATIENT)
Dept: FAMILY MEDICINE CLINIC | Facility: CLINIC | Age: 28
End: 2023-05-15

## 2023-05-16 ENCOUNTER — APPOINTMENT (OUTPATIENT)
Dept: PHYSICAL THERAPY | Age: 28
End: 2023-05-16
Payer: COMMERCIAL

## 2023-05-17 NOTE — TELEPHONE ENCOUNTER
From: Alex Marie  To: Dr. Bean Hubbard: 5/15/2023 10:19 AM EDT  Subject: Work note     Can you my chart me a work note from 5/8 to 5/15 this is in regards to a re injury (just minor swelling) and per my physical therapist I stayed out a extra week I was on FMLA I was getting notes periodically from the ortho but this was in regards to my ankle that initially hurt my knee if you can't right me one should I get one from the physical therapist?  Thanks Donovan desai

## 2023-05-18 ENCOUNTER — APPOINTMENT (OUTPATIENT)
Dept: PHYSICAL THERAPY | Age: 28
End: 2023-05-18
Payer: COMMERCIAL

## 2023-05-23 ENCOUNTER — APPOINTMENT (OUTPATIENT)
Dept: PHYSICAL THERAPY | Age: 28
End: 2023-05-23
Payer: COMMERCIAL

## 2023-05-25 ENCOUNTER — APPOINTMENT (OUTPATIENT)
Dept: PHYSICAL THERAPY | Age: 28
End: 2023-05-25
Payer: COMMERCIAL

## 2023-05-30 ENCOUNTER — APPOINTMENT (OUTPATIENT)
Dept: PHYSICAL THERAPY | Age: 28
End: 2023-05-30
Payer: COMMERCIAL

## 2023-08-15 ENCOUNTER — OFFICE VISIT (OUTPATIENT)
Dept: ORTHOPEDIC SURGERY | Age: 28
End: 2023-08-15

## 2023-08-15 DIAGNOSIS — M79.671 RIGHT FOOT PAIN: Primary | ICD-10-CM

## 2023-08-15 NOTE — PROGRESS NOTES
Name: Dimple Dash  YOB: 1995  Gender: male  MRN: [id number]    CC: Right foot pain    HPI:   08/15/2023: Initial visit: 3-week history right foot pain and swelling: No trauma    ROS/Meds/PSH/PMH/FH/SH: reviewed today    Tobacco:  reports that he has never smoked. He has never used smokeless tobacco.     Physical Examination:  Patient appears to be alert and oriented with acceptable appearance. No obvious distress or SOB  CV: appears to have acceptable vascular color and capillary refill  Neuro: appears to have mostly intact light touch sensation   Skin: Right midfoot to forefoot soft tissue swelling  MS: Standing: Plantigrade: Gait full  Right = no pain at first MTP  Right = no ankle or hindfoot pain  Right = 2-3 TMT pain but more pronounced 2-3 metatarsals  Right = good ankle/foot motor; 5/5 strength    XR: Right: Standing AP lateral mortise ankle plus AP oblique foot taken today with first MTP arthritis with prior cheilectomy and osteotomy; 2-3 tarsometatarsal arthritis; no definite stress fracture  XR Impression:  As above      Reviewed Test/Records/Documents:   04/10/2019: Right great toe cheilectomy: Proximal phalangeal osteotomy  03/15/2023: Dr. Cox Feeling - Quadriceps strain   Injection: Not applicable     Assessment:    Right foot pain, swelling - suspected metatarsal stress fracture  Right tarsometatarsal arthritis  Right big toe MTP arthritis     Plan:   The patient and I discussed the above assessment. We explored treatment options.      His history and exam are consistent with a stress fracture  He has tarsometatarsal arthritis but his pain is more in the metatarsals consistent with stress fracture  At this time, plan to treat a stress fracture and work-up if no improvement    Advanced medical imaging: Right foot MRI scan: No indication today  DME: Right 3D boot: Left even up  We discussed foot care and boot protection  PT: No indication  Orthotic/prosthetic: Potential future carbon

## 2023-09-11 ENCOUNTER — OFFICE VISIT (OUTPATIENT)
Dept: ORTHOPEDIC SURGERY | Age: 28
End: 2023-09-11

## 2023-09-11 DIAGNOSIS — M79.671 RIGHT FOOT PAIN: Primary | ICD-10-CM

## 2023-09-11 NOTE — PROGRESS NOTES
Name: Inna Trevino  YOB: 1995  Gender: male  MRN: [id number]    09/11/2023: He feels he is 80% better. HPI:   08/15/2023: Initial visit: 3-week history right foot pain and swelling: No trauma    ROS/Meds/PSH/PMH/FH/SH: reviewed today    Tobacco:  reports that he has never smoked. He has never used smokeless tobacco.     Physical Examination:  Patient appears to be alert and oriented with acceptable appearance. No obvious distress or SOB  CV: appears to have acceptable vascular color and capillary refill  Neuro: appears to have mostly intact light touch sensation   Skin: Right dorsal lateral midfoot mild soft tissue swelling  MS: Standing: Plantigrade: Gait full  Right = mild 3-4 metatarsal base pain; no pain distally  Right = good ankle/foot motor; 5/5 strength    XR: Right: Standing AP lateral mortise ankle plus AP oblique foot taken today with first MTP arthritis with prior cheilectomy and osteotomy; 3-4 metatarsal base calcifications consistent with possible stress fractures  XR Impression:  As above      Reviewed Test/Records/Documents:   04/10/2019: Right great toe cheilectomy: Proximal phalangeal osteotomy  03/15/2023: Dr. Cliff Correia - Quadriceps strain   Injection: Not applicable     Assessment:    Right foot pain - suspected 3-4 metatarsal base stress fractures  Right tarsometatarsal arthritis  Right big toe MTP arthritis     Plan:   The patient and I discussed the above assessment. We explored treatment options.      I still believe his exam, recovery and x-rays represent base 3-4 metatarsal stress fractures   He has no cavus or cavovarus component  Plan is to allow him to wean 3D boot into carbon fiber  Return in 3 weeks for x-ray and if no resolution, MRI scan    Advanced medical imaging: Right foot MRI scan: Potential future  We discussed foot care and protection  PT: No indication     Medication - OTC meds prn:   Surgical discussion: We discussed potential future first MTP

## 2023-09-11 NOTE — PROGRESS NOTES
The patient was prescribed and fitted with a carbon fiber shoe insert for the right foot, size XL. Patient read and signed documenting they understand and agree to [facility]'s current DME return policy.

## 2023-10-06 ENCOUNTER — TELEPHONE (OUTPATIENT)
Dept: ORTHOPEDIC SURGERY | Age: 28
End: 2023-10-06

## 2023-10-06 NOTE — TELEPHONE ENCOUNTER
Called and left message for pt about missed appointment with MET today. He was instructed to contact our office to r/s.

## (undated) DEVICE — INTENDED FOR TISSUE SEPARATION, AND OTHER PROCEDURES THAT REQUIRE A SHARP SURGICAL BLADE TO PUNCTURE OR CUT.: Brand: BARD-PARKER SAFETY BLADES SIZE 15, STERILE

## (undated) DEVICE — Device

## (undated) DEVICE — SUTURE VCRL SZ 2-0 L27IN ABSRB UD L36MM CP-1 1/2 CIR REV J266H

## (undated) DEVICE — BAND RUB 1/8X2.5IN STRL --

## (undated) DEVICE — SUTURE ETHLN SZ 4-0 L18IN NONABSORBABLE BLK L19MM PS-2 3/8 1667H

## (undated) DEVICE — DRAPE MICSCP W51XL150IN FOR LEICA M680 WILD OHS

## (undated) DEVICE — SHEET, T, LAPAROTOMY, STERILE: Brand: MEDLINE

## (undated) DEVICE — POSTERIOR LAMI VANPLT-LUCAS: Brand: MEDLINE INDUSTRIES, INC.

## (undated) DEVICE — REM POLYHESIVE ADULT PATIENT RETURN ELECTRODE: Brand: VALLEYLAB

## (undated) DEVICE — CURITY NON-ADHERENT STRIPS: Brand: CURITY

## (undated) DEVICE — (D)PREP SKN CHLRAPRP APPL 26ML -- CONVERT TO ITEM 371833

## (undated) DEVICE — INTENDED FOR TISSUE SEPARATION, AND OTHER PROCEDURES THAT REQUIRE A SHARP SURGICAL BLADE TO PUNCTURE OR CUT.: Brand: BARD-PARKER SAFETY BLADES SIZE 10, STERILE

## (undated) DEVICE — BONE WAX WHITE: Brand: BONE WAX WHITE

## (undated) DEVICE — 5.0MM PRECISION ROUND

## (undated) DEVICE — 2000CC GUARDIAN II: Brand: GUARDIAN

## (undated) DEVICE — ABSORBENT, WATERPROOF, BACTERIA PROOF FILM DRESSING: Brand: OPSITE POST OP 9.5X8.5CM CTN 20

## (undated) DEVICE — 1010 S-DRAPE TOWEL DRAPE 10/BX: Brand: STERI-DRAPE™

## (undated) DEVICE — BUTTON SWITCH PENCIL BLADE ELECTRODE, HOLSTER: Brand: EDGE

## (undated) DEVICE — AGENT HEMSTAT 8ML FLX TIP MTRX + DISP SURGIFLO

## (undated) DEVICE — GARMENT,MEDLINE,DVT,INT,CALF,MED, GEN2: Brand: MEDLINE

## (undated) DEVICE — AMD ANTIMICROBIAL BANDAGE ROLL,6 PLY: Brand: KERLIX

## (undated) DEVICE — PRECISION THIN, OFFSET (5.5 X 0.38 X 25.0MM)

## (undated) DEVICE — SOLUTION IV 1000ML 0.9% SOD CHL

## (undated) DEVICE — STRETCH BANDAGE ROLL: Brand: DERMACEA

## (undated) DEVICE — ABDOMINAL PAD: Brand: DERMACEA

## (undated) DEVICE — PREP SKN CHLRAPRP APL 26ML STR --

## (undated) DEVICE — BIT DRL TWST STR SHNK 2MM SS -- DISP 6/BX

## (undated) DEVICE — SUTURE VCRL + 3-0 L27IN ABSRB UD PS-2 L19MM 3/8 CIR PRIM VCP427H

## (undated) DEVICE — 3M™ STERI-DRAPE™ INSTRUMENT POUCH 1018: Brand: STERI-DRAPE™

## (undated) DEVICE — STERILE HOOK LOCK LATEX FREE ELASTIC BANDAGE 4INX5YD: Brand: HOOK LOCK™

## (undated) DEVICE — SUTURE VCRL SZ 1 L27IN ABSRB UD L36MM CP-1 1/2 CIR REV CUT J268H

## (undated) DEVICE — 3M™ IOBAN™ 2 ANTIMICROBIAL INCISE DRAPE 6650EZ: Brand: IOBAN™ 2

## (undated) DEVICE — SYR 10ML LUER LOK 1/5ML GRAD --

## (undated) DEVICE — SOLUTION IRRIG 1000ML 09% SOD CHL USP PIC PLAS CONTAINER

## (undated) DEVICE — PACKING 8004003 NEURAY 200PK 25X25MM: Brand: NEURAY ®

## (undated) DEVICE — NEEDLE HYPO 21GA L1.5IN INTRAMUSCULAR S STL LATCH BVL UP

## (undated) DEVICE — C-ARM: Brand: UNBRANDED

## (undated) DEVICE — JACKSON TABLE POSITIONER KIT: Brand: MEDLINE INDUSTRIES, INC.

## (undated) DEVICE — SUTURE VCRL SZ 0 L27IN ABSRB VLT L26MM CT-2 1/2 CIR J334H

## (undated) DEVICE — AMD ANTIMICROBIAL GAUZE SPONGES,12 PLY USP TYPE VII, 0.2% POLYHEXAMETHYLENE BIGUANIDE HCI (PHMB): Brand: CURITY

## (undated) DEVICE — DRAPE SHT 3 QTR PROXIMA 53X77 --

## (undated) DEVICE — CARDINAL HEALTH FLEXIBLE LIGHT HANDLE COVER: Brand: CARDINAL HEALTH

## (undated) DEVICE — SYSTEM SKIN CLSR 22CM DERMBND PRINEO

## (undated) DEVICE — ZIMMER® STERILE DISPOSABLE TOURNIQUET CUFF WITH PLC, DUAL PORT, SINGLE BLADDER, 18 IN. (46 CM)